# Patient Record
Sex: MALE | Race: BLACK OR AFRICAN AMERICAN | NOT HISPANIC OR LATINO | URBAN - METROPOLITAN AREA
[De-identification: names, ages, dates, MRNs, and addresses within clinical notes are randomized per-mention and may not be internally consistent; named-entity substitution may affect disease eponyms.]

---

## 2017-06-23 ENCOUNTER — INPATIENT (INPATIENT)
Facility: HOSPITAL | Age: 67
LOS: 2 days | Discharge: HOME | End: 2017-06-26
Attending: INTERNAL MEDICINE | Admitting: INTERNAL MEDICINE
Payer: MEDICARE

## 2017-06-23 VITALS
DIASTOLIC BLOOD PRESSURE: 120 MMHG | SYSTOLIC BLOOD PRESSURE: 207 MMHG | TEMPERATURE: 99 F | OXYGEN SATURATION: 100 % | RESPIRATION RATE: 16 BRPM | HEART RATE: 73 BPM

## 2017-06-23 DIAGNOSIS — Z29.9 ENCOUNTER FOR PROPHYLACTIC MEASURES, UNSPECIFIED: ICD-10-CM

## 2017-06-23 DIAGNOSIS — N18.9 CHRONIC KIDNEY DISEASE, UNSPECIFIED: ICD-10-CM

## 2017-06-23 DIAGNOSIS — I49.9 CARDIAC ARRHYTHMIA, UNSPECIFIED: ICD-10-CM

## 2017-06-23 DIAGNOSIS — R91.1 SOLITARY PULMONARY NODULE: ICD-10-CM

## 2017-06-23 DIAGNOSIS — M54.9 DORSALGIA, UNSPECIFIED: ICD-10-CM

## 2017-06-23 DIAGNOSIS — R79.89 OTHER SPECIFIED ABNORMAL FINDINGS OF BLOOD CHEMISTRY: ICD-10-CM

## 2017-06-23 DIAGNOSIS — I16.0 HYPERTENSIVE URGENCY: ICD-10-CM

## 2017-06-23 LAB
ALBUMIN SERPL ELPH-MCNC: 4.3 G/DL — SIGNIFICANT CHANGE UP (ref 3.3–5)
ALP SERPL-CCNC: 60 U/L — SIGNIFICANT CHANGE UP (ref 40–120)
ALT FLD-CCNC: 14 U/L — SIGNIFICANT CHANGE UP (ref 4–41)
APTT BLD: 28.3 SEC — SIGNIFICANT CHANGE UP (ref 27.5–37.4)
AST SERPL-CCNC: 28 U/L — SIGNIFICANT CHANGE UP (ref 4–40)
BASOPHILS # BLD AUTO: 0.04 K/UL — SIGNIFICANT CHANGE UP (ref 0–0.2)
BASOPHILS NFR BLD AUTO: 0.7 % — SIGNIFICANT CHANGE UP (ref 0–2)
BILIRUB SERPL-MCNC: 0.6 MG/DL — SIGNIFICANT CHANGE UP (ref 0.2–1.2)
BUN SERPL-MCNC: 13 MG/DL — SIGNIFICANT CHANGE UP (ref 7–23)
CALCIUM SERPL-MCNC: 9.2 MG/DL — SIGNIFICANT CHANGE UP (ref 8.4–10.5)
CHLORIDE SERPL-SCNC: 104 MMOL/L — SIGNIFICANT CHANGE UP (ref 98–107)
CK MB BLD-MCNC: 2.04 NG/ML — SIGNIFICANT CHANGE UP (ref 1–6.6)
CK MB BLD-MCNC: 2.16 NG/ML — SIGNIFICANT CHANGE UP (ref 1–6.6)
CK SERPL-CCNC: 267 U/L — HIGH (ref 30–200)
CK SERPL-CCNC: 322 U/L — HIGH (ref 30–200)
CO2 SERPL-SCNC: 26 MMOL/L — SIGNIFICANT CHANGE UP (ref 22–31)
CREAT SERPL-MCNC: 1.45 MG/DL — HIGH (ref 0.5–1.3)
EOSINOPHIL # BLD AUTO: 0.12 K/UL — SIGNIFICANT CHANGE UP (ref 0–0.5)
EOSINOPHIL NFR BLD AUTO: 2.1 % — SIGNIFICANT CHANGE UP (ref 0–6)
GLUCOSE SERPL-MCNC: 119 MG/DL — HIGH (ref 70–99)
HCT VFR BLD CALC: 45.1 % — SIGNIFICANT CHANGE UP (ref 39–50)
HGB BLD-MCNC: 14 G/DL — SIGNIFICANT CHANGE UP (ref 13–17)
IMM GRANULOCYTES NFR BLD AUTO: 0.2 % — SIGNIFICANT CHANGE UP (ref 0–1.5)
INR BLD: 0.96 — SIGNIFICANT CHANGE UP (ref 0.88–1.17)
LYMPHOCYTES # BLD AUTO: 2.15 K/UL — SIGNIFICANT CHANGE UP (ref 1–3.3)
LYMPHOCYTES # BLD AUTO: 37.7 % — SIGNIFICANT CHANGE UP (ref 13–44)
MAGNESIUM SERPL-MCNC: 1.9 MG/DL — SIGNIFICANT CHANGE UP (ref 1.6–2.6)
MCHC RBC-ENTMCNC: 24.4 PG — LOW (ref 27–34)
MCHC RBC-ENTMCNC: 31 % — LOW (ref 32–36)
MCV RBC AUTO: 78.6 FL — LOW (ref 80–100)
MONOCYTES # BLD AUTO: 0.6 K/UL — SIGNIFICANT CHANGE UP (ref 0–0.9)
MONOCYTES NFR BLD AUTO: 10.5 % — SIGNIFICANT CHANGE UP (ref 2–14)
NEUTROPHILS # BLD AUTO: 2.78 K/UL — SIGNIFICANT CHANGE UP (ref 1.8–7.4)
NEUTROPHILS NFR BLD AUTO: 48.8 % — SIGNIFICANT CHANGE UP (ref 43–77)
PHOSPHATE SERPL-MCNC: 2.8 MG/DL — SIGNIFICANT CHANGE UP (ref 2.5–4.5)
PLATELET # BLD AUTO: 193 K/UL — SIGNIFICANT CHANGE UP (ref 150–400)
PMV BLD: 9.8 FL — SIGNIFICANT CHANGE UP (ref 7–13)
POTASSIUM SERPL-MCNC: 3.9 MMOL/L — SIGNIFICANT CHANGE UP (ref 3.5–5.3)
POTASSIUM SERPL-SCNC: 3.9 MMOL/L — SIGNIFICANT CHANGE UP (ref 3.5–5.3)
PROT SERPL-MCNC: 7.6 G/DL — SIGNIFICANT CHANGE UP (ref 6–8.3)
PROTHROM AB SERPL-ACNC: 10.8 SEC — SIGNIFICANT CHANGE UP (ref 9.8–13.1)
RBC # BLD: 5.74 M/UL — SIGNIFICANT CHANGE UP (ref 4.2–5.8)
RBC # FLD: 15.9 % — HIGH (ref 10.3–14.5)
SODIUM SERPL-SCNC: 145 MMOL/L — SIGNIFICANT CHANGE UP (ref 135–145)
TROPONIN T SERPL-MCNC: < 0.06 NG/ML — SIGNIFICANT CHANGE UP (ref 0–0.06)
TROPONIN T SERPL-MCNC: < 0.06 NG/ML — SIGNIFICANT CHANGE UP (ref 0–0.06)
WBC # BLD: 5.7 K/UL — SIGNIFICANT CHANGE UP (ref 3.8–10.5)
WBC # FLD AUTO: 5.7 K/UL — SIGNIFICANT CHANGE UP (ref 3.8–10.5)

## 2017-06-23 PROCEDURE — 71020: CPT | Mod: 26

## 2017-06-23 PROCEDURE — 71275 CT ANGIOGRAPHY CHEST: CPT | Mod: 26

## 2017-06-23 PROCEDURE — 74174 CTA ABD&PLVS W/CONTRAST: CPT | Mod: 26

## 2017-06-23 RX ORDER — MORPHINE SULFATE 50 MG/1
4 CAPSULE, EXTENDED RELEASE ORAL ONCE
Qty: 0 | Refills: 0 | Status: DISCONTINUED | OUTPATIENT
Start: 2017-06-23 | End: 2017-06-23

## 2017-06-23 RX ORDER — HYDRALAZINE HCL 50 MG
25 TABLET ORAL THREE TIMES A DAY
Qty: 0 | Refills: 0 | Status: DISCONTINUED | OUTPATIENT
Start: 2017-06-23 | End: 2017-06-24

## 2017-06-23 RX ORDER — AMLODIPINE BESYLATE 2.5 MG/1
5 TABLET ORAL DAILY
Qty: 0 | Refills: 0 | Status: DISCONTINUED | OUTPATIENT
Start: 2017-06-23 | End: 2017-06-24

## 2017-06-23 RX ORDER — ACETAMINOPHEN 500 MG
650 TABLET ORAL EVERY 6 HOURS
Qty: 0 | Refills: 0 | Status: DISCONTINUED | OUTPATIENT
Start: 2017-06-23 | End: 2017-06-26

## 2017-06-23 RX ORDER — HYDRALAZINE HCL 50 MG
10 TABLET ORAL
Qty: 0 | Refills: 0 | Status: DISCONTINUED | OUTPATIENT
Start: 2017-06-23 | End: 2017-06-26

## 2017-06-23 RX ORDER — TRAMADOL HYDROCHLORIDE 50 MG/1
50 TABLET ORAL EVERY 6 HOURS
Qty: 0 | Refills: 0 | Status: DISCONTINUED | OUTPATIENT
Start: 2017-06-23 | End: 2017-06-26

## 2017-06-23 RX ORDER — LABETALOL HCL 100 MG
10 TABLET ORAL ONCE
Qty: 0 | Refills: 0 | Status: COMPLETED | OUTPATIENT
Start: 2017-06-23 | End: 2017-06-23

## 2017-06-23 RX ORDER — HEPARIN SODIUM 5000 [USP'U]/ML
5000 INJECTION INTRAVENOUS; SUBCUTANEOUS EVERY 8 HOURS
Qty: 0 | Refills: 0 | Status: DISCONTINUED | OUTPATIENT
Start: 2017-06-23 | End: 2017-06-26

## 2017-06-23 RX ORDER — SODIUM CHLORIDE 9 MG/ML
1000 INJECTION INTRAMUSCULAR; INTRAVENOUS; SUBCUTANEOUS ONCE
Qty: 0 | Refills: 0 | Status: COMPLETED | OUTPATIENT
Start: 2017-06-23 | End: 2017-06-23

## 2017-06-23 RX ADMIN — HEPARIN SODIUM 5000 UNIT(S): 5000 INJECTION INTRAVENOUS; SUBCUTANEOUS at 14:23

## 2017-06-23 RX ADMIN — Medication 10 MILLIGRAM(S): at 09:09

## 2017-06-23 RX ADMIN — HEPARIN SODIUM 5000 UNIT(S): 5000 INJECTION INTRAVENOUS; SUBCUTANEOUS at 20:59

## 2017-06-23 RX ADMIN — Medication 10 MILLIGRAM(S): at 17:33

## 2017-06-23 RX ADMIN — Medication 25 MILLIGRAM(S): at 20:59

## 2017-06-23 RX ADMIN — SODIUM CHLORIDE 1000 MILLILITER(S): 9 INJECTION INTRAMUSCULAR; INTRAVENOUS; SUBCUTANEOUS at 10:42

## 2017-06-23 RX ADMIN — MORPHINE SULFATE 4 MILLIGRAM(S): 50 CAPSULE, EXTENDED RELEASE ORAL at 09:39

## 2017-06-23 RX ADMIN — AMLODIPINE BESYLATE 5 MILLIGRAM(S): 2.5 TABLET ORAL at 12:50

## 2017-06-23 RX ADMIN — MORPHINE SULFATE 4 MILLIGRAM(S): 50 CAPSULE, EXTENDED RELEASE ORAL at 09:09

## 2017-06-23 NOTE — CONSULT NOTE ADULT - PROBLEM SELECTOR RECOMMENDATION 3
? Acute due to accelerated blood pressure: Agree with card for rather slow decline in blood pressure at this time. Patients' very comfortable.  .

## 2017-06-23 NOTE — H&P ADULT - PROBLEM SELECTOR PLAN 4
DVT ppx - HSQ - patient with incidental nodule found on CTA today  - no respiratory symptoms  - outpatient repeat CT chest in 6-12 month to assess stability

## 2017-06-23 NOTE — H&P ADULT - NSHPREVIEWOFSYSTEMS_GEN_ALL_CORE
Review of Systems:   CONSTITUTIONAL: No fever, weight loss, or fatigue  EYES: No eye pain, visual disturbances  ENMT:  No difficulty hearing, vertigo; No sinus or throat pain  NECK: No pain or stiffness  RESPIRATORY: No cough, wheezing, No shortness of breath  CARDIOVASCULAR: No chest pain, palpitations, dizziness, or leg swelling  GASTROINTESTINAL: No abdominal or epigastric pain. No nausea, vomiting, or hematemesis; No diarrhea or constipation.   GENITOURINARY: No dysuria or increased urinary frequency  NEUROLOGICAL: No headaches or tremors  SKIN: No itching, burning, rashes, or lesions   LYMPH NODES: No enlarged glands  ENDOCRINE: No heat or cold intolerance; No hair loss  MUSCULOSKELETAL: No joint pain or swelling; No muscle, back, or extremity pain  PSYCHIATRIC: No depression, anxiety, mood swings, or difficulty sleeping  HEME/LYMPH: No easy bruising, or bleeding gums  ALLERY AND IMMUNOLOGIC: No hives or eczema Review of Systems:   CONSTITUTIONAL: No fever, weight loss, or fatigue  EYES: No eye pain, visual disturbances  ENMT:  No difficulty hearing, vertigo; No sinus or throat pain  NECK: No pain or stiffness  RESPIRATORY: No cough, wheezing, No shortness of breath  CARDIOVASCULAR: No chest pain, palpitations, dizziness, or leg swelling  GASTROINTESTINAL: No abdominal or epigastric pain. No nausea, vomiting, or hematemesis; No diarrhea or constipation.   GENITOURINARY: No dysuria or increased urinary frequency  NEUROLOGICAL: No headaches or tremors  SKIN: No itching, burning, rashes, or lesions   LYMPH NODES: No enlarged glands in neck  MUSCULOSKELETAL: No joint pain or swelling; + back pain  HEME/LYMPH: No easy bruising, or bleeding gums

## 2017-06-23 NOTE — ED PROVIDER NOTE - OBJECTIVE STATEMENT
67M h/o HTN presenting with back pain. Notes L lower back pain radiating down both legs starting 2 days ago, constant, 6/10. Notes having back pain previously but not radiating down legs. Has not seen doctor or taken prescription medication in years, unsure of other medical history or previous medications. Denies F, visual changes, CP, SOB, abd pain, N/V/D, urinary changes, difficulty walking. 67M h/o HTN presenting with back pain. Notes L lower back pain radiating down both legs starting 2 days ago, constant, 6/10. Notes having back pain previously but not radiating down legs. Has not seen doctor or taken prescription medication in years, unsure of other medical history or previous medications. Denies fever, visual changes, CP, SOB, abd pain, N/V/D, urinary changes, difficulty walking.

## 2017-06-23 NOTE — ED PROVIDER NOTE - MEDICAL DECISION MAKING DETAILS
67M h/o HTN p/w back pain radiating down both legs x2 days, no acute neuro findings, associated with HTN to systolic in 220s, concerning for dissection  -labs, pain control, XR, CT Montero: 67M h/o HTN p/w back pain radiating down both legs x2 days, no acute neuro findings, associated with HTN to systolic in 220s, concerning for aortic dissection, will obtain CT r/o Ao dissection  -labs, pain control, XR, CT

## 2017-06-23 NOTE — H&P ADULT - NSHPSOCIALHISTORY_GEN_ALL_CORE
Lives with girlfriend and son  No prior cigarette use  Drinks 2 beers - sometimes every other day  No drug use  Unemployed  Originally from [place]

## 2017-06-23 NOTE — H&P ADULT - PROBLEM SELECTOR PLAN 1
- patient with -220 in ED  - goal -180  - start norvasc 5  - admit to telemetry  - unclear if elevated creatinine is patient's baseline or 2/2 severe hypertension  - trend cardiac enzymes  - consider inpatient TTE

## 2017-06-23 NOTE — H&P ADULT - NSHPPHYSICALEXAM_GEN_ALL_CORE
Vital Signs Last 24 Hrs  T(C): 36.7, Max: 37 (06-23 @ 08:03)  HR: 54 (54 - 73)  BP: 212/95 (182/104 - 221/123)  RR: 17 (16 - 17)  SpO2: 99% (99% - 100%)  Wt(kg): --  CAPILLARY BLOOD GLUCOSE    I&O's Summary      PHYSICAL EXAM:  GENERAL: NAD, well-developed  HEAD:  Atraumatic, Normocephalic  EYES: EOMI, PERRLA, conjunctiva and sclera clear  NECK: Supple, No JVD  CHEST/LUNG: Clear to auscultation bilaterally; No wheeze  HEART: Regular rate and rhythm; No murmurs, rubs, or gallops  ABDOMEN: Soft, Nontender, Nondistended; Bowel sounds present  EXTREMITIES:  2+ Peripheral Pulses, No clubbing, cyanosis, or edema  PSYCH: AAOx3  NEUROLOGY: non-focal  SKIN: No rashes or lesions Vital Signs Last 24 Hrs  T(C): 36.7, Max: 37 (06-23 @ 08:03)  HR: 54 (54 - 73)  BP: 212/95 (182/104 - 221/123)  RR: 17 (16 - 17)  SpO2: 99% (99% - 100%)  Wt(kg): --  CAPILLARY BLOOD GLUCOSE    I&O's Summary      PHYSICAL EXAM:  GENERAL: NAD, well-developed  HEAD:  Atraumatic, Normocephalic  EYES: EOMI, conjunctiva and sclera clear  NECK: Supple, No JVD  CHEST/LUNG: Clear to auscultation bilaterally; No wheeze crackles or rales  HEART: Regular rate and rhythm; No murmurs, rubs, or gallops  ABDOMEN: Soft, Nontender, Nondistended; Bowel sounds present  BACK: no spinal or paraspinal muscle tenderness to palpation  EXTREMITIES:  No clubbing, cyanosis, or edema  PSYCH: AAOx3  NEUROLOGY: non-focal; straight leg raise negative bilaterally  SKIN: No rashes or lesions

## 2017-06-23 NOTE — ED ADULT NURSE NOTE - OBJECTIVE STATEMENT
Pt presents to room 26, A&Ox3, ambulatory at baseline without assistance, coming in for evaluation of intermittent lower back pain radiating into the left leg x several months, worse over the past day.  denies any bowel/bladder incontinence, denies any fall/trauma precipating back Pt presents to room 26, A&Ox3, ambulatory at baseline without assistance, coming in for evaluation of intermittent lower back pain radiating into the left leg x several months, worse over the past day.  denies any bowel/bladder incontinence, denies any fall/trauma.  pt very hypertensive, has not seen a doctor in 5 years, non compliant with blood pressure medications x 2 years.  Denies any chest pain, dizziness, headache, nausea, vomiting, shortness of breath, palpitations, diarrhea, fever, constipation, or chills. Pt presents to room 26, A&Ox3, ambulatory at baseline without assistance, coming in for evaluation of intermittent lower back pain radiating into the left leg x several months, worse over the past day.  denies any bowel/bladder incontinence, denies any fall/trauma.  pt very hypertensive, has not seen a doctor in 5 years, non compliant with blood pressure medications x 2 years.  Denies any chest pain, dizziness, headache, nausea, vomiting, shortness of breath, palpitations, diarrhea, fever, constipation, or chills.  IV established in right ac with a 20g, labs drawn and sent, call bell in reach, side rails up, bed in locked position, md evaluation in progress, pt on telemetry-NSR @ 72 noted, will continue to monitor.

## 2017-06-23 NOTE — H&P ADULT - PROBLEM SELECTOR PLAN 3
- patient with back pain without red flag symptoms (no weight loss, fevers, urinary or fecal incontinence, loss of sensation)  - resolved at time of this H&P, suspect musculoskeletal in nature given history  - c/w pain control  - will likely need physical therapy as outpatient

## 2017-06-23 NOTE — H&P ADULT - PROBLEM SELECTOR PLAN 2
- patient with elevated creatinine of 1.45 today, baseline unknown; unclear if elevated creatinine 2/2 elevated BP  - continue to trend, monitor for contrast induced nephropathy given that patient received contrast today  - avoid nephrotoxic medications

## 2017-06-23 NOTE — CONSULT NOTE ADULT - PROBLEM SELECTOR RECOMMENDATION 2
monitor crt , K  consider renal consult
Uncontrolled blood pressure: on hydralazine: optimize antihypertensives

## 2017-06-23 NOTE — H&P ADULT - HISTORY OF PRESENT ILLNESS
Patient is a 66 y/o M PMH HTN not on medications for the last 5 years, presents with left sided lower back pain x 1 day. Patient states that pain is 10/10 in severity, sharp, with radiation the right lower back. No tingling or numbness in legs. Denies any recent trauma or fall. States he has had back pain for years, similar to this pain, but not as severe. No pain at rest, but movement exacerbates the pain. States he took some morphine, which helped with the pain. No fevers, chills, night sweats, chest pain, SOB, abdominal pain, nausea, vomiting, diarrhea or constipation. No dysuria. No headache, vision changes, or lower extremity edema. Otherwise feels well. States he took a medication 5 years ago for blood pressure, knows the dose was 5 mg but not sure of the name.    In ED:  Vitals : T 98.1, HR 72, /123, RR 16, O2 sat 99% on room air  Received morphine 4 mg IV x 1, labetalol 10 mg IV x 1, NS 1L x1    Patient admitted to telemetry for further management

## 2017-06-23 NOTE — ED PROVIDER NOTE - PROGRESS NOTE DETAILS
BP responded adequately to labetalol 10mg IV. Labs show mildly elevated Cr 1.45, no baseline for comparison. Prelim CT results show no dissection. 4 beats of V-tach noted on cardiac monitor while in ED. Will admit for further cardiac monitoring and treatment of hypertension, d/w Dr Juarez who agreed to admit

## 2017-06-23 NOTE — ED ADULT TRIAGE NOTE - CHIEF COMPLAINT QUOTE
Pt arrives to ED c/o b/l lower back pain radiating down thighs. Denies falls/trauma. States pain has been going on "for a while, but since yesterday afternoon has been consistent". Took one of his wifes "pain killers" unsure name of medication prior to coming to ED. Pt is calm/comfortable appearing, ambulating w/o asst. Pt BP noted to be elevated in triage 207/120, states used to take BP meds but self dc'd 2 yrs ago, no other PMHx. Denies HA/vision changes.

## 2017-06-23 NOTE — H&P ADULT - ASSESSMENT
68 y/o M PMH HTN not on medications 66 y/o M PMH HTN not on medications presents with back pain x 1 day, found to have hypertensive urgency, also with elevated creatinine (baseline unknown)

## 2017-06-23 NOTE — H&P ADULT - NSHPLABSRESULTS_GEN_ALL_CORE
LABS:                        14.0   5.70  )-----------( 193      ( 23 Jun 2017 08:56 )             45.1     06-23    145  |  104  |  13  ----------------------------<  119<H>  3.9   |  26  |  1.45<H>    Ca    9.2      23 Jun 2017 08:56  Phos  2.8     06-23  Mg     1.9     06-23    TPro  7.6  /  Alb  4.3  /  TBili  0.6  /  DBili  x   /  AST  28  /  ALT  14  /  AlkPhos  60  06-23    PT/INR - ( 23 Jun 2017 08:56 )   PT: 10.8 SEC;   INR: 0.96          PTT - ( 23 Jun 2017 08:56 )  PTT:28.3 SEC  CARDIAC MARKERS ( 23 Jun 2017 08:56 )  x     / < 0.06 ng/mL / 322 u/L / 2.16 ng/mL / x              RADIOLOGY & ADDITIONAL TESTS:    Imaging Personally Reviewed:    Consultant(s) Notes Reviewed:      Care Discussed with Consultants/Other Providers: LABS:                        14.0   5.70  )-----------( 193      ( 23 Jun 2017 08:56 )             45.1     06-23    145  |  104  |  13  ----------------------------<  119<H>  3.9   |  26  |  1.45<H>    Ca    9.2      23 Jun 2017 08:56  Phos  2.8     06-23  Mg     1.9     06-23    TPro  7.6  /  Alb  4.3  /  TBili  0.6  /  DBili  x   /  AST  28  /  ALT  14  /  AlkPhos  60  06-23    PT/INR - ( 23 Jun 2017 08:56 )   PT: 10.8 SEC;   INR: 0.96          PTT - ( 23 Jun 2017 08:56 )  PTT:28.3 SEC  CARDIAC MARKERS ( 23 Jun 2017 08:56 )  x     / < 0.06 ng/mL / 322 u/L / 2.16 ng/mL / x              RADIOLOGY & ADDITIONAL TESTS:    Imaging Personally Reviewed:    CTA chest/abdomen/pelvis:  No aortic dissection.    7 mm right lower lobe pulmonary nodule. A 6 to 12 month follow-up CT is   recommended to ascertain stability.

## 2017-06-23 NOTE — CONSULT NOTE ADULT - PROBLEM SELECTOR RECOMMENDATION 9
cont norvasc  will hold of to bb for now given low HR  will add hydralazine , keep BP between 150-170 systolic for now.  check echo, check renal artery duplex rule out NATALIA.
7 mm nodule:  RLL  nON SMOKER: OUTPATIENT FOLLOW ct scan chest in 6 months

## 2017-06-24 DIAGNOSIS — I47.2 VENTRICULAR TACHYCARDIA: ICD-10-CM

## 2017-06-24 LAB
BUN SERPL-MCNC: 11 MG/DL — SIGNIFICANT CHANGE UP (ref 7–23)
CALCIUM SERPL-MCNC: 9.3 MG/DL — SIGNIFICANT CHANGE UP (ref 8.4–10.5)
CHLORIDE SERPL-SCNC: 102 MMOL/L — SIGNIFICANT CHANGE UP (ref 98–107)
CO2 SERPL-SCNC: 25 MMOL/L — SIGNIFICANT CHANGE UP (ref 22–31)
CREAT SERPL-MCNC: 1.24 MG/DL — SIGNIFICANT CHANGE UP (ref 0.5–1.3)
GLUCOSE SERPL-MCNC: 141 MG/DL — HIGH (ref 70–99)
HCT VFR BLD CALC: 46.1 % — SIGNIFICANT CHANGE UP (ref 39–50)
HGB BLD-MCNC: 14.3 G/DL — SIGNIFICANT CHANGE UP (ref 13–17)
MAGNESIUM SERPL-MCNC: 2 MG/DL — SIGNIFICANT CHANGE UP (ref 1.6–2.6)
MCHC RBC-ENTMCNC: 24.3 PG — LOW (ref 27–34)
MCHC RBC-ENTMCNC: 31 % — LOW (ref 32–36)
MCV RBC AUTO: 78.4 FL — LOW (ref 80–100)
PLATELET # BLD AUTO: 199 K/UL — SIGNIFICANT CHANGE UP (ref 150–400)
PMV BLD: 10.6 FL — SIGNIFICANT CHANGE UP (ref 7–13)
POTASSIUM SERPL-MCNC: 3.4 MMOL/L — LOW (ref 3.5–5.3)
POTASSIUM SERPL-SCNC: 3.4 MMOL/L — LOW (ref 3.5–5.3)
RBC # BLD: 5.88 M/UL — HIGH (ref 4.2–5.8)
RBC # FLD: 15.9 % — HIGH (ref 10.3–14.5)
SODIUM SERPL-SCNC: 142 MMOL/L — SIGNIFICANT CHANGE UP (ref 135–145)
WBC # BLD: 4.96 K/UL — SIGNIFICANT CHANGE UP (ref 3.8–10.5)
WBC # FLD AUTO: 4.96 K/UL — SIGNIFICANT CHANGE UP (ref 3.8–10.5)

## 2017-06-24 RX ORDER — POTASSIUM CHLORIDE 20 MEQ
40 PACKET (EA) ORAL ONCE
Qty: 0 | Refills: 0 | Status: COMPLETED | OUTPATIENT
Start: 2017-06-24 | End: 2017-06-24

## 2017-06-24 RX ORDER — METOPROLOL TARTRATE 50 MG
25 TABLET ORAL ONCE
Qty: 0 | Refills: 0 | Status: COMPLETED | OUTPATIENT
Start: 2017-06-24 | End: 2017-06-24

## 2017-06-24 RX ORDER — AMLODIPINE BESYLATE 2.5 MG/1
5 TABLET ORAL ONCE
Qty: 0 | Refills: 0 | Status: COMPLETED | OUTPATIENT
Start: 2017-06-24 | End: 2017-06-24

## 2017-06-24 RX ORDER — METOPROLOL TARTRATE 50 MG
25 TABLET ORAL
Qty: 0 | Refills: 0 | Status: DISCONTINUED | OUTPATIENT
Start: 2017-06-24 | End: 2017-06-26

## 2017-06-24 RX ORDER — HYDRALAZINE HCL 50 MG
50 TABLET ORAL EVERY 8 HOURS
Qty: 0 | Refills: 0 | Status: DISCONTINUED | OUTPATIENT
Start: 2017-06-24 | End: 2017-06-26

## 2017-06-24 RX ORDER — AMLODIPINE BESYLATE 2.5 MG/1
10 TABLET ORAL DAILY
Qty: 0 | Refills: 0 | Status: DISCONTINUED | OUTPATIENT
Start: 2017-06-25 | End: 2017-06-26

## 2017-06-24 RX ADMIN — Medication 25 MILLIGRAM(S): at 21:06

## 2017-06-24 RX ADMIN — Medication 25 MILLIGRAM(S): at 12:49

## 2017-06-24 RX ADMIN — Medication 25 MILLIGRAM(S): at 05:04

## 2017-06-24 RX ADMIN — HEPARIN SODIUM 5000 UNIT(S): 5000 INJECTION INTRAVENOUS; SUBCUTANEOUS at 21:06

## 2017-06-24 RX ADMIN — HEPARIN SODIUM 5000 UNIT(S): 5000 INJECTION INTRAVENOUS; SUBCUTANEOUS at 15:17

## 2017-06-24 RX ADMIN — Medication 40 MILLIEQUIVALENT(S): at 12:49

## 2017-06-24 RX ADMIN — Medication 50 MILLIGRAM(S): at 15:17

## 2017-06-24 RX ADMIN — Medication 50 MILLIGRAM(S): at 21:06

## 2017-06-24 RX ADMIN — AMLODIPINE BESYLATE 5 MILLIGRAM(S): 2.5 TABLET ORAL at 12:49

## 2017-06-24 RX ADMIN — HEPARIN SODIUM 5000 UNIT(S): 5000 INJECTION INTRAVENOUS; SUBCUTANEOUS at 05:04

## 2017-06-24 RX ADMIN — AMLODIPINE BESYLATE 5 MILLIGRAM(S): 2.5 TABLET ORAL at 05:04

## 2017-06-24 NOTE — CONSULT NOTE ADULT - SUBJECTIVE AND OBJECTIVE BOX
HPI:  Patient is a 66 y/o M PMH HTN not on medications for the last 5 years, presents with left sided lower back pain x 1 day. Patient states that pain is 10/10 in severity, sharp, with radiation the right lower back. No tingling or numbness in legs. Denies any recent trauma or fall. States he has had back pain for years, similar to this pain, but not as severe. No pain at rest, but movement exacerbates the pain. States he took some morphine, which helped with the pain. No fevers, chills, night sweats, chest pain, SOB, abdominal pain, nausea, vomiting, diarrhea or constipation. No dysuria. No headache, vision changes, or lower extremity edema. Otherwise feels well. States he took a medication 5 years ago for blood pressure, knows the dose was 5 mg but not sure of the name.    At present pt with very little pain. On questioning reports episodic radiating pain into right proximal lower extremity.    In ED:  Vitals : T 98.1, HR 72, /123, RR 16, O2 sat 99% on room air  Received morphine 4 mg IV x 1, labetalol 10 mg IV x 1, NS 1L x1    Patient admitted to telemetry for further management (23 Jun 2017 12:37)          Review of Systems:  All review of systems negative, except for those marked:  General:		  Eyes:			  ENT:			  Pulmonary:		  Cardiac:		  Gastrointestinal:	  Renal/Urologic:	  Musculoskeletal		  Endocrine:		  Hematologic:	  Neurologic:		  Skin:			  Allergy/Immune	  Psychiatric:		    PAST MEDICAL & SURGICAL HISTORY:  HTN (hypertension)  No significant past surgical history    Past Hospitalizations:  MEDICATIONS  (STANDING):  amLODIPine   Tablet 5milliGRAM(s) Oral daily  heparin  Injectable 5000Unit(s) SubCutaneous every 8 hours  hydrALAZINE 25milliGRAM(s) Oral three times a day    MEDICATIONS  (PRN):  acetaminophen   Tablet. 650milliGRAM(s) Oral every 6 hours PRN Mild Pain (1 - 3)  traMADol 50milliGRAM(s) Oral every 6 hours PRN moderate or severe pain  hydrALAZINE Injectable 10milliGRAM(s) IV Push four times a day PRN systolic greater than 185 mmHg    Allergies    No Known Allergies    Intolerances          FAMILY HISTORY:  No pertinent family history in first degree relatives    [] Mental Retardation/Developmental Delay:  [] Cerebral Palsy:  [] Autism:  [] Deafness:  [] Speech Delay:  [] Blindness:  [] Learning Disorder:  [] Depression:  [] ADD  [] Bipolar Disorder:  [] Tourette  [] Obsessive Compulsive DIsorder:  [] Epilepsy  [] Psychosis  [] Other:    Social History  -ETOH/Tob    Vital Signs Last 24 Hrs  T(C): 36.8, Max: 37 (06-23 @ 08:03)  T(F): 98.2, Max: 98.6 (06-23 @ 08:03)  HR: 69 (54 - 78)  BP: 152/100 (152/100 - 221/123)  BP(mean): 121 (121 - 131)  RR: 18 (14 - 18)  SpO2: 98% (98% - 100%)  Daily Height in cm: 180.34 (23 Jun 2017 18:23)    Daily       GENERAL PHYSICAL EXAM  All physical exam findings normal, except for those marked:  General:	well nourished, not acutely or chronically ill-appearing  HEENT:	normocephalic, atraumatic, clear conjunctiva, external ear normal, TM clear, nasal mucosa normal, oral pharynx clear  Neck:          supple, full range of motion, no nuchal rigidity  Extremities:	no joint swelling, erythema, tenderness; normal ROM, no contractures  Skin:		no rash    NEUROLOGIC EXAM  Mental Status:     Oriented to time/place/person; Good eye contact ; follow simple commands ;  Age appropriate language  and fund of  knowledge.  Cranial Nerves:   PERRL, EOMI, no facial asymmetry , V1-V3 intact , symmetric palate, tongue midline.   Eyes:			Normal: optic discs   Visual Fields:		Full visual field  Muscle Strength:	 Full strength 5/5, proximal and distal,  upper and lower extremities  Muscle Tone:	Normal tone  Deep Tendon Reflexes:         2+/4  : Biceps, Brachioradialis, Triceps Bilateral;  2+/4 : Pattelar, Ankle bilateral. No clonus.  Plantar Response:	Plantar reflexes flexion bilaterally  Sensation:		Intact to pain, light touch, temperature and vibration throughout.  Coordination/	No dysmetria in finger to nose test bilaterally  Cerebellum	  Tandem Gait/Romberg	Normal gait     Lab Results:                        14.0   5.70  )-----------( 193      ( 23 Jun 2017 08:56 )             45.1     06-23    145  |  104  |  13  ----------------------------<  119<H>  3.9   |  26  |  1.45<H>    Ca    9.2      23 Jun 2017 08:56  Phos  2.8     06-23  Mg     1.9     06-23    TPro  7.6  /  Alb  4.3  /  TBili  0.6  /  DBili  x   /  AST  28  /  ALT  14  /  AlkPhos  60  06-23    LIVER FUNCTIONS - ( 23 Jun 2017 08:56 )  Alb: 4.3 g/dL / Pro: 7.6 g/dL / ALK PHOS: 60 u/L / ALT: 14 u/L / AST: 28 u/L / GGT: x           PT/INR - ( 23 Jun 2017 08:56 )   PT: 10.8 SEC;   INR: 0.96          PTT - ( 23 Jun 2017 08:56 )  PTT:28.3 SEC
CHIEF COMPLAINT: back pain    HISTORY OF PRESENT ILLNESS:    This is a pleasant 67 male with history of HTN not on meds, presented to ED with low back pain found to have very high BP and elevated crt , CTA ruled out for aortic dissection.   he denies any chest pain, sob, dizziness or palpitation     PAST MEDICAL & SURGICAL HISTORY:  HTN (hypertension)  No significant past surgical history          MEDICATIONS:  amLODIPine   Tablet 5milliGRAM(s) Oral daily  heparin  Injectable 5000Unit(s) SubCutaneous every 8 hours  hydrALAZINE 25milliGRAM(s) Oral three times a day  hydrALAZINE Injectable 10milliGRAM(s) IV Push four times a day PRN        acetaminophen   Tablet. 650milliGRAM(s) Oral every 6 hours PRN  traMADol 50milliGRAM(s) Oral every 6 hours PRN            FAMILY HISTORY:  No pertinent family history in first degree relatives      Non-contributory    SOCIAL HISTORY:    No tobacco, drugs or etoh    Allergies    No Known Allergies    Intolerances    	    REVIEW OF SYSTEMS:  CONSTITUTIONAL: No fever, weight loss, or fatigue  EYES: No eye pain, visual disturbances, or discharge  ENMT:  No difficulty hearing, tinnitus, vertigo; No sinus or throat pain  NECK: No pain or stiffness  RESPIRATORY: No cough, wheezing, chills or hemoptysis; No Shortness of Breath  CARDIOVASCULAR: No chest pain, palpitations, passing out, dizziness, or leg swelling  GASTROINTESTINAL: No abdominal or epigastric pain. No nausea, vomiting, or hematemesis; No diarrhea or constipation. No melena or hematochezia.  GENITOURINARY: No dysuria, frequency, hematuria, or incontinence  NEUROLOGICAL: No headaches, memory loss, loss of strength, numbness, or tremors  SKIN: No itching, burning, rashes, or lesions   LYMPH Nodes: No enlarged glands  ENDOCRINE: No heat or cold intolerance; No hair loss  MUSCULOSKELETAL: No joint pain or swelling; No muscle, back, or extremity pain  PSYCHIATRIC: No depression, anxiety, mood swings, or difficulty sleeping  HEME/LYMPH: No easy bruising, or bleeding gums  ALLERY AND IMMUNOLOGIC: No hives or eczema	        PHYSICAL EXAM:  T(C): 36.7, Max: 37 (06-23 @ 08:03)  HR: 64 (54 - 73)  BP: 194/109 (182/104 - 221/123)  RR: 16 (16 - 17)  SpO2: 99% (99% - 100%)  Wt(kg): --  I&O's Summary      Appearance: Normal	  HEENT:   Normal oral mucosa, PERRL, EOMI	  Lymphatic: No lymphadenopathy  Cardiovascular: Normal S1 S2, Murmur:   Neck: JVP normal  Respiratory: Lungs clear to auscultation	  Gastrointestinal:  Soft, Non-tender, + BS	  Skin: No rashes, No ecchymoses, No cyanosis	  Neurologic: Non-focal, awake , alert , oriented   Extremities: Normal range of motion, No clubbing, cyanosis or edema  Vascular: Peripheral pulses palpable 2+ bilaterally    TELEMETRY: 	    ECG:  	  RADIOLOGY:  OTHER: 	  	  LABS:	 	    CARDIAC MARKERS:  Troponin T, Serum: < 0.06 ng/mL (06-23 @ 15:25)  Troponin T, Serum: < 0.06 ng/mL (06-23 @ 08:56)      CKMB: 2.04 ng/mL (06-23 @ 15:25)  CKMB: 2.16 ng/mL (06-23 @ 08:56)                              14.0   5.70  )-----------( 193      ( 23 Jun 2017 08:56 )             45.1     06-23    145  |  104  |  13  ----------------------------<  119<H>  3.9   |  26  |  1.45<H>    Ca    9.2      23 Jun 2017 08:56  Phos  2.8     06-23  Mg     1.9     06-23    TPro  7.6  /  Alb  4.3  /  TBili  0.6  /  DBili  x   /  AST  28  /  ALT  14  /  AlkPhos  60  06-23    proBNP:   Lipid Profile:   HgA1c:   TSH:
Pt is seen and examined  At th is time he has no symptoms no cough or SOB  He has no pulmonary history      Patient is a 67y old  Male who presents with a chief complaint of back pain (23 Jun 2017 12:37)      HPI:  Patient is a 68 y/o M PMH HTN not on medications for the last 5 years, presents with left sided lower back pain x 1 day. Patient states that pain is 10/10 in severity, sharp, with radiation the right lower back. No tingling or numbness in legs. Denies any recent trauma or fall. States he has had back pain for years, similar to this pain, but not as severe. No pain at rest, but movement exacerbates the pain. States he took some morphine, which helped with the pain. No fevers, chills, night sweats, chest pain, SOB, abdominal pain, nausea, vomiting, diarrhea or constipation. No dysuria. No headache, vision changes, or lower extremity edema. Otherwise feels well. States he took a medication 5 years ago for blood pressure, knows the dose was 5 mg but not sure of the name.    In ED:  Vitals : T 98.1, HR 72, /123, RR 16, O2 sat 99% on room air  Received morphine 4 mg IV x 1, labetalol 10 mg IV x 1, NS 1L x1    Patient admitted to telemetry for further management (23 Jun 2017 12:37)      ?FOLLOWING PRESENT  [x ] Hx of PE/DVT, [ x] Hx COPD, [x ] Hx of Asthma, [ x] Hx of Hospitalization, [x ]  Hx of BiPAP/CPAP use, [x ] Hx of CARLEY    Allergies    No Known Allergies    Intolerances        PAST MEDICAL & SURGICAL HISTORY:  HTN (hypertension)  No significant past surgical history      FAMILY HISTORY:  No pertinent family history in first degree relatives      Social History: [x  ] TOBACCO                  [ x ] ETOH                                 [ x ] IVDA/DRUGS    REVIEW OF SYSTEMS      General:	x    Skin/Breast:x  	  Ophthalmologic:x  	  ENMT:	x    Respiratory and Thorax:x  	  Cardiovascular:	x    Gastrointestinal:	x    Genitourinary:	  x  Musculoskeletal:	back pain    Neurological:	x    Psychiatric:	x    Hematology/Lymphatics:x	    Endocrine:	x    Allergic/Immunologic:	x    MEDICATIONS  (STANDING):  amLODIPine   Tablet 5milliGRAM(s) Oral daily  heparin  Injectable 5000Unit(s) SubCutaneous every 8 hours  hydrALAZINE 25milliGRAM(s) Oral three times a day    MEDICATIONS  (PRN):  acetaminophen   Tablet. 650milliGRAM(s) Oral every 6 hours PRN Mild Pain (1 - 3)  traMADol 50milliGRAM(s) Oral every 6 hours PRN moderate or severe pain  hydrALAZINE Injectable 10milliGRAM(s) IV Push four times a day PRN systolic greater than 185 mmHg       Vital Signs Last 24 Hrs  T(C): 36.7, Max: 37 (06-23 @ 08:03)  T(F): 98.1, Max: 98.6 (06-23 @ 08:03)  HR: 64 (54 - 73)  BP: 194/109 (182/104 - 221/123)  BP(mean): --  RR: 16 (16 - 17)  SpO2: 99% (99% - 100%)        I&O's Summary      Physical Exam:   GENERAL: NAD, well-groomed, well-developed  HEENT: ZIA/   Atraumatic, Normocephalic  ENMT: No tonsillar erythema, exudates, or enlargement; Moist mucous membranes, Good dentition, No lesions  NECK: Supple, No JVD, Normal thyroid  CHEST/LUNG: Clear to percussion bilaterally; No rales, rhonchi, wheezing, or rubs  CVS: Regular rate and rhythm; No murmurs, rubs, or gallops  GI: : Soft, Nontender, Nondistended; Bowel sounds present  NERVOUS SYSTEM:  Alert & Oriented X3  EXTREMITIES:  2+ Peripheral Pulses, No clubbing, cyanosis, or edema  LYMPH: No lymphadenopathy noted  SKIN: No rashes or lesions  ENDOCRINOLOGY: No Thyromegaly  PSYCH: Appropriate    Labs:    CARDIAC MARKERS ( 23 Jun 2017 15:25 )  x     / < 0.06 ng/mL / 267 u/L / 2.04 ng/mL / x      CARDIAC MARKERS ( 23 Jun 2017 08:56 )  x     / < 0.06 ng/mL / 322 u/L / 2.16 ng/mL / x                                14.0   5.70  )-----------( 193      ( 23 Jun 2017 08:56 )             45.1     06-23    145  |  104  |  13  ----------------------------<  119<H>  3.9   |  26  |  1.45<H>    Ca    9.2      23 Jun 2017 08:56  Phos  2.8     06-23  Mg     1.9     06-23    TPro  7.6  /  Alb  4.3  /  TBili  0.6  /  DBili  x   /  AST  28  /  ALT  14  /  AlkPhos  60  06-23    CAPILLARY BLOOD GLUCOSE    LIVER FUNCTIONS - ( 23 Jun 2017 08:56 )  Alb: 4.3 g/dL / Pro: 7.6 g/dL / ALK PHOS: 60 u/L / ALT: 14 u/L / AST: 28 u/L / GGT: x           PT/INR - ( 23 Jun 2017 08:56 )   PT: 10.8 SEC;   INR: 0.96          PTT - ( 23 Jun 2017 08:56 )  PTT:28.3 SEC      Studies  Chest X-RAY  CT SCAN Chest ETROPERITONEUM: No lymphadenopathy.    ABDOMINAL WALL:Within normal limits.  BONES: Degenerative changes of the spine.    IMPRESSION:    No aortic dissection.    7 mm right lower lobe pulmonary nodule. A 6 to 12 month follow-up CT is   recommended to ascertain stability.  CT Abdomen  Venous Dopplers: LE:   Others

## 2017-06-24 NOTE — PROGRESS NOTE ADULT - ASSESSMENT
68 y/o M PMH HTN not on medications presents with back pain x 1 day, found to have hypertensive urgency and RLL pulmonary nodule.

## 2017-06-24 NOTE — CONSULT NOTE ADULT - ASSESSMENT
Lumbago  Lumbar Radiculopathy
68 y/o M PMH HTN not on medications presents with back pain x 1 day, found to have hypertensive urgency, also with elevated creatinine (baseline unknown)  on ct chest found to have RLL pulmonary nodule

## 2017-06-24 NOTE — CONSULT NOTE ADULT - ATTENDING COMMENTS
Patient stable at present. I advised outpatient neurology follow up for possible neuroimaging if symptoms persist.
Thank you for allowing me to participate in the care of this patient.   Zia Darling MD, [facility]  [phone]

## 2017-06-24 NOTE — PROGRESS NOTE ADULT - ASSESSMENT
68 yo M as above:  1. NSVT - Echo, Cardio f/u, c/w Metoprolol  2. HTN - BP uncontrolled, increase Norvasc to 10mg daily (give 5mg now) and Hydralazine to 50mg q8h, monitor BP  3, ? CKD - Cr improved, outpt Renal eval  4. DVT prophylaxis  5. Back pain - lumbar radiculopathy, pain control, outpt follow up

## 2017-06-25 DIAGNOSIS — R00.1 BRADYCARDIA, UNSPECIFIED: ICD-10-CM

## 2017-06-25 LAB
BUN SERPL-MCNC: 15 MG/DL — SIGNIFICANT CHANGE UP (ref 7–23)
CALCIUM SERPL-MCNC: 9.2 MG/DL — SIGNIFICANT CHANGE UP (ref 8.4–10.5)
CHLORIDE SERPL-SCNC: 99 MMOL/L — SIGNIFICANT CHANGE UP (ref 98–107)
CO2 SERPL-SCNC: 23 MMOL/L — SIGNIFICANT CHANGE UP (ref 22–31)
CREAT SERPL-MCNC: 1.34 MG/DL — HIGH (ref 0.5–1.3)
GLUCOSE SERPL-MCNC: 107 MG/DL — HIGH (ref 70–99)
HCT VFR BLD CALC: 46.2 % — SIGNIFICANT CHANGE UP (ref 39–50)
HGB BLD-MCNC: 14.2 G/DL — SIGNIFICANT CHANGE UP (ref 13–17)
MAGNESIUM SERPL-MCNC: 2.1 MG/DL — SIGNIFICANT CHANGE UP (ref 1.6–2.6)
MCHC RBC-ENTMCNC: 24.1 PG — LOW (ref 27–34)
MCHC RBC-ENTMCNC: 30.7 % — LOW (ref 32–36)
MCV RBC AUTO: 78.6 FL — LOW (ref 80–100)
PLATELET # BLD AUTO: 186 K/UL — SIGNIFICANT CHANGE UP (ref 150–400)
PMV BLD: 10 FL — SIGNIFICANT CHANGE UP (ref 7–13)
POTASSIUM SERPL-MCNC: 3.7 MMOL/L — SIGNIFICANT CHANGE UP (ref 3.5–5.3)
POTASSIUM SERPL-SCNC: 3.7 MMOL/L — SIGNIFICANT CHANGE UP (ref 3.5–5.3)
RBC # BLD: 5.88 M/UL — HIGH (ref 4.2–5.8)
RBC # FLD: 16 % — HIGH (ref 10.3–14.5)
SODIUM SERPL-SCNC: 139 MMOL/L — SIGNIFICANT CHANGE UP (ref 135–145)
WBC # BLD: 5.58 K/UL — SIGNIFICANT CHANGE UP (ref 3.8–10.5)
WBC # FLD AUTO: 5.58 K/UL — SIGNIFICANT CHANGE UP (ref 3.8–10.5)

## 2017-06-25 RX ADMIN — Medication 25 MILLIGRAM(S): at 05:49

## 2017-06-25 RX ADMIN — Medication 50 MILLIGRAM(S): at 13:15

## 2017-06-25 RX ADMIN — AMLODIPINE BESYLATE 10 MILLIGRAM(S): 2.5 TABLET ORAL at 05:49

## 2017-06-25 RX ADMIN — HEPARIN SODIUM 5000 UNIT(S): 5000 INJECTION INTRAVENOUS; SUBCUTANEOUS at 13:15

## 2017-06-25 RX ADMIN — Medication 50 MILLIGRAM(S): at 21:20

## 2017-06-25 RX ADMIN — Medication 25 MILLIGRAM(S): at 17:13

## 2017-06-25 RX ADMIN — HEPARIN SODIUM 5000 UNIT(S): 5000 INJECTION INTRAVENOUS; SUBCUTANEOUS at 21:20

## 2017-06-25 RX ADMIN — Medication 50 MILLIGRAM(S): at 05:49

## 2017-06-25 RX ADMIN — HEPARIN SODIUM 5000 UNIT(S): 5000 INJECTION INTRAVENOUS; SUBCUTANEOUS at 05:49

## 2017-06-25 NOTE — PROGRESS NOTE ADULT - ASSESSMENT
66 yo M as above:  1. NSVT - Echo, Cardio f/u, c/w Metoprolol  2. HTN - BP better controlled, c/w medications  3, ? CKD - Cr improved, outpt Renal eval  4. DVT prophylaxis  5. Back pain - lumbar radiculopathy, pain control, outpt follow up  6. Pt would like to leave today and states will sign out AMA despite need of work up for NSVT being explained to him by me and Cardio team, pt understands the risks of signing out AMA including death.

## 2017-06-26 VITALS
RESPIRATION RATE: 18 BRPM | TEMPERATURE: 98 F | OXYGEN SATURATION: 97 % | SYSTOLIC BLOOD PRESSURE: 155 MMHG | DIASTOLIC BLOOD PRESSURE: 91 MMHG | HEART RATE: 75 BPM

## 2017-06-26 LAB
BUN SERPL-MCNC: 16 MG/DL — SIGNIFICANT CHANGE UP (ref 7–23)
CALCIUM SERPL-MCNC: 9.1 MG/DL — SIGNIFICANT CHANGE UP (ref 8.4–10.5)
CHLORIDE SERPL-SCNC: 100 MMOL/L — SIGNIFICANT CHANGE UP (ref 98–107)
CO2 SERPL-SCNC: 24 MMOL/L — SIGNIFICANT CHANGE UP (ref 22–31)
CREAT SERPL-MCNC: 1.34 MG/DL — HIGH (ref 0.5–1.3)
GLUCOSE SERPL-MCNC: 135 MG/DL — HIGH (ref 70–99)
HCT VFR BLD CALC: 45.8 % — SIGNIFICANT CHANGE UP (ref 39–50)
HGB BLD-MCNC: 14.1 G/DL — SIGNIFICANT CHANGE UP (ref 13–17)
MAGNESIUM SERPL-MCNC: 2.2 MG/DL — SIGNIFICANT CHANGE UP (ref 1.6–2.6)
MCHC RBC-ENTMCNC: 24.2 PG — LOW (ref 27–34)
MCHC RBC-ENTMCNC: 30.8 % — LOW (ref 32–36)
MCV RBC AUTO: 78.6 FL — LOW (ref 80–100)
PLATELET # BLD AUTO: 195 K/UL — SIGNIFICANT CHANGE UP (ref 150–400)
PMV BLD: 9.7 FL — SIGNIFICANT CHANGE UP (ref 7–13)
POTASSIUM SERPL-MCNC: 4 MMOL/L — SIGNIFICANT CHANGE UP (ref 3.5–5.3)
POTASSIUM SERPL-SCNC: 4 MMOL/L — SIGNIFICANT CHANGE UP (ref 3.5–5.3)
RBC # BLD: 5.83 M/UL — HIGH (ref 4.2–5.8)
RBC # FLD: 16.1 % — HIGH (ref 10.3–14.5)
SODIUM SERPL-SCNC: 139 MMOL/L — SIGNIFICANT CHANGE UP (ref 135–145)
WBC # BLD: 5.28 K/UL — SIGNIFICANT CHANGE UP (ref 3.8–10.5)
WBC # FLD AUTO: 5.28 K/UL — SIGNIFICANT CHANGE UP (ref 3.8–10.5)

## 2017-06-26 PROCEDURE — 93306 TTE W/DOPPLER COMPLETE: CPT | Mod: 26

## 2017-06-26 RX ORDER — METOPROLOL TARTRATE 50 MG
1 TABLET ORAL
Qty: 60 | Refills: 0 | OUTPATIENT
Start: 2017-06-26 | End: 2017-07-26

## 2017-06-26 RX ORDER — HYDRALAZINE HCL 50 MG
1 TABLET ORAL
Qty: 90 | Refills: 0 | OUTPATIENT
Start: 2017-06-26 | End: 2017-07-26

## 2017-06-26 RX ORDER — AMLODIPINE BESYLATE 2.5 MG/1
1 TABLET ORAL
Qty: 30 | Refills: 0 | OUTPATIENT
Start: 2017-06-26 | End: 2017-07-26

## 2017-06-26 RX ADMIN — Medication 25 MILLIGRAM(S): at 05:28

## 2017-06-26 RX ADMIN — Medication 50 MILLIGRAM(S): at 05:28

## 2017-06-26 RX ADMIN — Medication 50 MILLIGRAM(S): at 13:57

## 2017-06-26 RX ADMIN — HEPARIN SODIUM 5000 UNIT(S): 5000 INJECTION INTRAVENOUS; SUBCUTANEOUS at 05:28

## 2017-06-26 RX ADMIN — AMLODIPINE BESYLATE 10 MILLIGRAM(S): 2.5 TABLET ORAL at 05:28

## 2017-06-26 NOTE — PROGRESS NOTE ADULT - PROBLEM SELECTOR PLAN 1
better controlled  cont current meds
better controlled  cont current meds
F/u outpatient CT chest: in 6 months time: to ensure it is not increasing in time.
better controlled  cont current meds
F/u outpatient CT chest

## 2017-06-26 NOTE — PROGRESS NOTE ADULT - PROBLEM SELECTOR PROBLEM 2
CKD (chronic kidney disease)
Hypertensive urgency
CKD (chronic kidney disease)
CKD (chronic kidney disease)
Hypertensive urgency

## 2017-06-26 NOTE — DISCHARGE NOTE ADULT - PLAN OF CARE
Continue medications as currently prescribed. Follow up with your PMD for further management of disease. Dash diet. Prevent repeat cardiac arrythmia Please follow up with Dr. Darling as an outpatient, you will likely need a stress test to complete your work up. Continue your medications as prescribed. Follow up with Dr. Laguna as an outpatient to monitor pulmonary nodule. A repeat CT chest will be needed in 6 months.

## 2017-06-26 NOTE — PROGRESS NOTE ADULT - PROBLEM SELECTOR PLAN 2
fu with renal
fu with renal
Resolved. Management per primary team
fu with renal
Resolved. Management per primary team

## 2017-06-26 NOTE — DISCHARGE NOTE ADULT - MEDICATION SUMMARY - MEDICATIONS TO TAKE
I will START or STAY ON the medications listed below when I get home from the hospital:    metoprolol tartrate 25 mg oral tablet  -- 1 tab(s) by mouth 2 times a day  -- Indication: For HTN (hypertension)    amLODIPine 10 mg oral tablet  -- 1 tab(s) by mouth once a day  -- Indication: For HTN (hypertension)    hydrALAZINE 50 mg oral tablet  -- 1 tab(s) by mouth every 8 hours  -- Indication: For HTN (hypertension)

## 2017-06-26 NOTE — DISCHARGE NOTE ADULT - PATIENT PORTAL LINK FT
“You can access the [facility] Patient Portal, offered by [facility], by registering with the following website: [email or web address]”

## 2017-06-26 NOTE — PROGRESS NOTE ADULT - ASSESSMENT
66 yo Male as above:  1. NSVT - Echo, Cardio f/u, c/w Metoprolol  2. HTN - BP better controlled, c/w medications  3, ? CKD - Cr improved, outpt Renal eval  4. DVT prophylaxis  5. Back pain - lumbar radiculopathy, pain control, outpt follow up  d/c if cleared by cards

## 2017-06-26 NOTE — PROGRESS NOTE ADULT - PROBLEM SELECTOR PLAN 3
low dose bb  monitor HR  check echo and eventually needs ischemic work up
low dose bb  monitor HR  check echo and eventually needs ischemic work up
On heparin SQ
add low dose bb  monitor HR  check echo and eventually needs ischemic work up
On heparin SQ

## 2017-06-26 NOTE — DISCHARGE NOTE ADULT - HOSPITAL COURSE
Patient is a 68 y/o M PMH HTN not on medications for the last 5 years, presents with left sided lower back pain x 1 day. Patient states that pain is 10/10 in severity, sharp, with radiation the right lower back. No tingling or numbness in legs. Denies any recent trauma or fall. States he has had back pain for years, similar to this pain, but not as severe. No pain at rest, but movement exacerbates the pain. States he took some morphine, which helped with the pain. No fevers, chills, night sweats, chest pain, SOB, abdominal pain, nausea, vomiting, diarrhea or constipation. No dysuria. No headache, vision changes, or lower extremity edema. Otherwise feels well. States he took a medication 5 years ago for blood pressure, knows the dose was 5 mg but not sure of the name.      On admission:  CE neg x 2   CTA Chest/abd/pelvis: No aortic dissection.7 mm right lower lobe pulmonary nodule. A 6 to 12 month follow-up CT is  recommended to ascertain stability.  CXR:Fine strands of opacity in the peripheral right lower lung compatible   subsegmental hepatic change or scarring. Clear remaining visualized   lungs. No pleural effusions or pneumothorax.Slightly enlarged cardiac and mediastinal silhouettes. Trachea midline.Mild spinal degenerative changes. Unremarkable remaining visualized osseous structures.  6/23 Cardio : Recommendation: cont norvasc will hold off to bb for now given low HR will add hydralazine , keep BP between 150-170 systolic for now.check echo, check renal artery duplex rule out NATALIA.  6/24 Med: NSVT - Echo, Cardio f/u, c/w Metoprolol 2. HTN - BP uncontrolled, increase Norvasc to 10mg daily (give 5mg now) and Hydralazine to 50mg q8h, monitor BP 3. ? CKD - Cr improved, outpt Renal eval 4. DVT prophylaxis 5. Back pain - lumbar radiculopathy, pain control, outpt follow up Hypertensive urgency.  Plan: better controlled  cont current meds. CKD (chronic kidney disease).  Plan: fu with renal.   6/24 Cardio: NSVT: add low dose bb, monitor HR, check echo and eventually needs ischemic work up.  6/24 Neuro: Lumbago, Lumbar Radiculopathy: Patient stable at present. I advised outpatient neurology follow up for possible neuroimaging if symptoms persist.   6/24 Pulm: Pulmonary nodule: F/u outpatient CT chest  6/25 MED:  1. NSVT - Echo, Cardio f/u, c/w Metoprolol 2. HTN - BP better controlled, c/w medications 3, ? CKD - Cr improved, outpt Renal eval 5. Back pain - lumbar radiculopathy, pain control, outpt follow up . Pt would like to leave today and states will sign out AMA despite need of work up for NSVT being explained to him by me and Cardio team, pt understands the risks of signing out AMA including death.   6/25 CARDIO Franck: HTN Urgemcy--> Controlled w/ meds, CKD - renal f/up, NSVT - low dose BB- monitor HR,  check echo and eventually needs ischemic work up. Sinus Jaya nocturnal , suspect underlying CARLEY. outpt sleep study.      Echocardiogram was normal. Cleared for discharge with outpatient follow up to have stress test with Dr. Darling.

## 2017-06-26 NOTE — PROGRESS NOTE ADULT - PROBLEM SELECTOR PROBLEM 3
NSVT (nonsustained ventricular tachycardia)
Need for prophylactic measure
NSVT (nonsustained ventricular tachycardia)
NSVT (nonsustained ventricular tachycardia)
Need for prophylactic measure

## 2017-06-26 NOTE — DISCHARGE NOTE ADULT - CARE PROVIDER_API CALL
Zia Darling), Cardiovascular Disease; Internal Medicine  [address] [place] [address]  Phone: [phone]  Fax: [phone]    Vaughn Laguna), Critical Care Medicine; Internal Medicine; Pulmonary Disease; Sleep Medicine  [address] [place] [address]  Phone: [phone]  Fax: [phone]

## 2017-06-26 NOTE — PROGRESS NOTE ADULT - PROVIDER SPECIALTY LIST ADULT
Internal Medicine
Pulmonology
Pulmonology
Cardiology

## 2017-06-26 NOTE — PROGRESS NOTE ADULT - PROBLEM SELECTOR PLAN 4
nocturnal , suspect underlying CARLEY. outpt sleep study
nocturnal , suspect underlying CARLEY. outpt sleep study

## 2017-06-26 NOTE — DISCHARGE NOTE ADULT - CARE PLAN
Principal Discharge DX:	NSVT (nonsustained ventricular tachycardia)  Goal:	Prevent repeat cardiac arrythmia  Instructions for follow-up, activity and diet:	Please follow up with Dr. Darling as an outpatient, you will likely need a stress test to complete your work up. Continue your medications as prescribed.  Secondary Diagnosis:	Essential hypertension  Instructions for follow-up, activity and diet:	Continue medications as currently prescribed. Follow up with your PMD for further management of disease. Dash diet.  Secondary Diagnosis:	Pulmonary nodule  Instructions for follow-up, activity and diet:	Follow up with Dr. Laguna as an outpatient to monitor pulmonary nodule. A repeat CT chest will be needed in 6 months.

## 2017-06-26 NOTE — DISCHARGE NOTE ADULT - ADMISSION DATE +STARTOFVISITDATE
First name:       Marck                 MR # [id number]  Date of Birth: [birth year]  Time of Birth: 15:38    Birth Weight:  2630    Date of Admission:  17       Gestational Age: 38.5      Source of admission [ x] Inborn     [ __ ]Transport from    [facility]:  38.4 week GA female born to a 31 y/o   mother via . Maternal history signficant for methadone maintenance positive PCP on utox. Pregnancy with fetal alert for possible cardiac anomaly, Possible Ductal Arteriosis restriction, will need outpatient followup for atrial septum assessment secondary to sibling with ASD. mother had PTL early in pregnancy s/p cerclage ,  mother admitted with ROM, decreased fetal movement and vaginal bleeding r/o abruption.  Maternal blood type O+. Prenatal labs negative, nonreactive and immune. GBS negative on .  SROM <18hrs with clear fluid. Baby born vigorous and crying spontaneously. Warmed, dried, stimulated. Apgars 9/9.  Transferred to NICU for observation for resp distress /shallow breathing      Social History: Hx oxycodone abuse, last 1.5 years ago now on methadone. smoked during pregnancy   FHx: sibling with ASD   ROS: unable to obtain ()     Interval Events:    EVELINA scores low 0-2, improved po feeding    **************************************************************************************************  Age: 8d    Vital Signs:  T(C): 37 (17 @ 09:00), Max: 37 (17 @ 02:30)  HR: 142 (17 @ 09:00) (130 - 150)  BP: 71/46 (17 @ 09:00) (71/46 - 72/44)  BP(mean): 50 (17 @ 09:00) (50 - 50)  ABP: --  ABP(mean): --  RR: 48 (17 @ 09:00) (48 - 54)  SpO2: 99% (17 @ 09:00) (97% - 100%)  Height (cm): 46 ( @ 20:00)  Drug Dosing Weight: Weight (kg): 2.309 (20 Aug 2017 20:00)    MEDICATIONS:  MEDICATIONS  (STANDING):    MEDICATIONS  (PRN):      RESPIRATORY SUPPORT:  [ _ ] Mechanical Ventilation:   [ _ ] Nasal Cannula: _ __ _ Liters, FiO2: ___ %  [ _ ]RA    LABS:         Blood type, Baby [] ABO: O  Rh; Positive DC; Negative                            22.5   16.81 )-----------( 187             [ @ 21:20]                  63.3  S 82.0%  B 4.0%  Cora 0%  Myelo 0%  Promyelo 0%  Blasts 0%  Lymph 4.0%  Mono 8.0%  Eos 1.0%  Baso 0%  Retic 0%      Bili T/D  [ @ 02:20] - 12.2/0.3, Bili T/D  [ @ 03:15] - 12.9/0.3, Bili T/D  [ @ 14:30] - 15.9/0.3      CAPILLARY BLOOD GLUCOSE      *************************************************************************************************    ADDITIONAL LABS:  Utox pos for methadone    med tox  pendiing      CULTURES:  MRSA cx        IMAGING STUDIES:   hypoinflated, increased  cardiolthymic silhouette, AXR non-specific gas pattern       WEIGHT:   2309 +42  FLUIDS AND NUTRITION:   Intake(ml/kg/day):  161  Urine output:              x 8               Stools:  x 1    Diet - Enteral:  Diet - Parenteral:      WEEKLY DATA  Postmenstrual age:			Date:  Head Circumference:		32.5 	Date:     Weight gain: Gram/kg/day:		Date:  Weight gain: Gram/day:		Date:  Bruni percentile for weight:			Date:    PHYSICAL EXAM:  General:	         Awake and active; in no acute distress  Head:		AFO wide   Eyes:		Normally set bilaterally  Ears:		Patent bilaterally, no deformities  Nose/Mouth:	Nares patent, palate intact  Neck:		No masses, intact clavicles  Chest/Lungs:      Breath sounds equal to auscultation. No retractions  CV:		No murmurs appreciated, normal pulses bilaterally  Abdomen:          Soft nontender nondistended, no masses, bowel sounds present  :		Normal for gestational age  Spine:		Intact, no sacral dimples or tags  Anus:		Grossly patent  Extremities:	FROM, no hip clicks  Skin:		Pink, no lesions  Neuro exam:	Appropriate tone, activity    DISCHARGE PLANNING (date and status):  Hep B Vacc    CCHD:			  :	n/a 				  Hearing:    screen:	  Circumcision: n/a   Hip US rec: na/   	  Synagis: no 			  Other Immunizations (with dates):    		  Neurodevelop eval?	completed , but consult note pending   CPR class done?  	    VITD at DC?  PMD:          Name:  ______________ _             Contact information:  ______________ _  Pharmacy: Name:  ______________ _              Contact information:  ______________ _  Social FOB updated 8/15   Follow-up appointments (list):  PMD, cardiology       Time spent on the total subsequent encounter with >50% of the visit spent on counseling and/or coordination of care:[ _ ] 15 min[ _ ] 25 min[ _ ] 35 min  [ _ ] Discharge time spent >30 min Statement Selected

## 2017-06-26 NOTE — PROGRESS NOTE ADULT - SUBJECTIVE AND OBJECTIVE BOX
Subjective: Patient seen and examined. No new events except as noted.     SUBJECTIVE/ROS:      No chest pain, sob, dizziness, palpitation, nausea, vomiting, fever, diarrhea, constipation, syncope. All other systems reviewed and negative.   MEDICATIONS:  MEDICATIONS  (STANDING):  amLODIPine   Tablet 5milliGRAM(s) Oral daily  heparin  Injectable 5000Unit(s) SubCutaneous every 8 hours  hydrALAZINE 25milliGRAM(s) Oral three times a day      PHYSICAL EXAM:  T(C): 36.4, Max: 36.9 (06-23 @ 18:23)  HR: 65 (54 - 78)  BP: 175/105 (152/100 - 212/95)  RR: 17 (14 - 18)  SpO2: 100% (98% - 100%)  Wt(kg): --  I&O's Summary    I & Os for current day (as of 24 Jun 2017 10:54)  =============================================  IN: 220 ml / OUT: 0 ml / NET: 220 ml    Height (cm): 180.3 (06-23 @ 18:23)  Weight (kg): 115.3 (06-23 @ 18:23)  BMI (kg/m2): 35.5 (06-23 @ 18:23)  BSA (m2): 2.33 (06-23 @ 18:23)    Appearance: Normal	  HEENT:   Normal oral mucosa, PERRL, EOMI	  Cardiovascular: Normal S1 S2,    Murmur:   Neck: JVP normal  Respiratory: Lungs clear to auscultation  Gastrointestinal:  Soft, Non-tender, + BS	  Skin: normal   Musculoskeletal: Normal range of motion, normal strength  Psychiatry:  Mood & affect appropriate  Ext: No edema        LABS:    CARDIAC MARKERS:  CARDIAC MARKERS ( 23 Jun 2017 15:25 )  x     / < 0.06 ng/mL / 267 u/L / 2.04 ng/mL / x      CARDIAC MARKERS ( 23 Jun 2017 08:56 )  x     / < 0.06 ng/mL / 322 u/L / 2.16 ng/mL / x                                    14.0   5.70  )-----------( 193      ( 23 Jun 2017 08:56 )             45.1     06-23    145  |  104  |  13  ----------------------------<  119<H>  3.9   |  26  |  1.45<H>    Ca    9.2      23 Jun 2017 08:56  Phos  2.8     06-23  Mg     1.9     06-23    TPro  7.6  /  Alb  4.3  /  TBili  0.6  /  DBili  x   /  AST  28  /  ALT  14  /  AlkPhos  60  06-23    proBNP:   Lipid Profile:   HgA1c:   TSH:           TELEMETRY: 	  sinus, NSVT 4 beats   ECG:  	  RADIOLOGY:   DIAGNOSTIC TESTING:  Echocardiogram:  Catheterization:  Stress Test:    OTHER:
Subjective: Patient seen and examined. No new events except as noted.     SUBJECTIVE/ROS:    No chest pain, or sob.   The rest of 14 point ROS otherwise reviewed and reported unremarkable.   MEDICATIONS:  MEDICATIONS  (STANDING):  heparin  Injectable 5000Unit(s) SubCutaneous every 8 hours  metoprolol 25milliGRAM(s) Oral two times a day  amLODIPine   Tablet 10milliGRAM(s) Oral daily  hydrALAZINE 50milliGRAM(s) Oral every 8 hours      PHYSICAL EXAM:  T(C): 36.6, Max: 37.1 (06-24 @ 14:33)  HR: 63 (54 - 72)  BP: 161/98 (127/69 - 183/122)  RR: 18 (18 - 19)  SpO2: 100% (100% - 100%)  Wt(kg): --  I&O's Summary    I & Os for current day (as of 25 Jun 2017 10:29)  =============================================  IN: 240 ml / OUT: 0 ml / NET: 240 ml        Appearance: Normal	  HEENT:   Normal oral mucosa, PERRL, EOMI	  Cardiovascular: Normal S1 S2,    Murmur:   Neck: JVP normal  Respiratory: Lungs clear to auscultation  Gastrointestinal:  Soft, Non-tender, + BS	  Skin: normal   Musculoskeletal: Normal range of motion, normal strength  Psychiatry:  Mood & affect appropriate  Ext: No edema        LABS:    CARDIAC MARKERS:  CARDIAC MARKERS ( 23 Jun 2017 15:25 )  x     / < 0.06 ng/mL / 267 u/L / 2.04 ng/mL / x      CARDIAC MARKERS ( 23 Jun 2017 08:56 )  x     / < 0.06 ng/mL / 322 u/L / 2.16 ng/mL / x                                    14.2   5.58  )-----------( 186      ( 25 Jun 2017 06:00 )             46.2     06-25    139  |  99  |  15  ----------------------------<  107<H>  3.7   |  23  |  1.34<H>    Ca    9.2      25 Jun 2017 06:00  Mg     2.1     06-25      proBNP:   Lipid Profile:   HgA1c:   TSH:           TELEMETRY: 	  sinus, sinus lilia, v bigeminy   ECG:  	  RADIOLOGY:   DIAGNOSTIC TESTING:  Echocardiogram:  Catheterization:  Stress Test:    OTHER:
Subjective: Patient seen and examined. No new events except as noted.     SUBJECTIVE/ROS:  No chest pain, or sob.   The rest of 14 point ROS otherwise reviewed and reported unremarkable.     MEDICATIONS:  MEDICATIONS  (STANDING):  heparin  Injectable 5000Unit(s) SubCutaneous every 8 hours  metoprolol 25milliGRAM(s) Oral two times a day  amLODIPine   Tablet 10milliGRAM(s) Oral daily  hydrALAZINE 50milliGRAM(s) Oral every 8 hours      PHYSICAL EXAM:  T(C): 36.8, Max: 36.9 (06-25 @ 13:11)  HR: 71 (59 - 74)  BP: 157/105 (137/92 - 179/112)  RR: 18 (17 - 18)  SpO2: 100% (100% - 100%)  Wt(kg): --  I&O's Summary  I & Os for 24h ending 26 Jun 2017 07:00  =============================================  IN: 220 ml / OUT: 0 ml / NET: 220 ml    I & Os for current day (as of 26 Jun 2017 10:55)  =============================================  IN: 320 ml / OUT: 0 ml / NET: 320 ml        Appearance: Normal	  HEENT:   Normal oral mucosa, PERRL, EOMI	  Cardiovascular: Normal S1 S2,    Murmur:   Neck: JVP normal  Respiratory: Lungs clear to auscultation  Gastrointestinal:  Soft, Non-tender, + BS	  Skin: normal   Neuro: No gross deficits.   Psychiatry:  Mood & affect appropriate  Ext: No edema        LABS:    CARDIAC MARKERS:  CARDIAC MARKERS ( 23 Jun 2017 15:25 )  x     / < 0.06 ng/mL / 267 u/L / 2.04 ng/mL / x                                    14.1   5.28  )-----------( 195      ( 26 Jun 2017 05:50 )             45.8     06-26    139  |  100  |  16  ----------------------------<  135<H>  4.0   |  24  |  1.34<H>    Ca    9.1      26 Jun 2017 05:50  Mg     2.2     06-26      proBNP:   Lipid Profile:   HgA1c:   TSH:           TELEMETRY: 	    ECG:  	  RADIOLOGY:   DIAGNOSTIC TESTING:  Echocardiogram:  Catheterization:  Stress Test:    OTHER:
CHIEF COMPLAINT:Patient is a 67y old  Male who presents with a chief complaint of back pain (23 Jun 2017 18:27)    	  Interval history: back pain resolved      Allergies:  No Known Allergies      PAST MEDICAL & SURGICAL HISTORY:  HTN (hypertension)  No significant past surgical history      FAMILY HISTORY:  No pertinent family history in first degree relatives      REVIEW OF SYSTEMS:  CONSTITUTIONAL: No fever, weight loss, or fatigue  EYES: No eye pain, visual disturbances, or discharge  NECK: No pain or stiffness  RESPIRATORY: No cough or wheezing, no shortness of breath  CARDIOVASCULAR: No chest pain, palpitations, dizziness, or leg swelling  GASTROINTESTINAL: No abdominal or epigastric pain. No nausea, vomiting, diarrhea or constipation  GENITOURINARY: No dysuria, urinary frequency or urgency, no hematuria  NEUROLOGICAL: No headaches, memory loss, loss of strength, numbness, or tremors  SKIN: No itching, burning, rashes, or lesions   MUSCULOSKELETAL: No joint pain or swelling; No muscle, back, or extremity pain      Medications:  MEDICATIONS  (STANDING):  heparin  Injectable 5000Unit(s) SubCutaneous every 8 hours  metoprolol 25milliGRAM(s) Oral two times a day  amLODIPine   Tablet 10milliGRAM(s) Oral daily  hydrALAZINE 50milliGRAM(s) Oral every 8 hours    MEDICATIONS  (PRN):  acetaminophen   Tablet. 650milliGRAM(s) Oral every 6 hours PRN Mild Pain (1 - 3)  traMADol 50milliGRAM(s) Oral every 6 hours PRN moderate or severe pain  hydrALAZINE Injectable 10milliGRAM(s) IV Push four times a day PRN systolic greater than 185 mmHg    	    PHYSICAL EXAM:  T(C): 36.8, Max: 36.9 (06-25 @ 13:11)  HR: 74 (54 - 74)  BP: 179/112 (127/69 - 179/112)  RR: 17 (17 - 18)  SpO2: 100% (100% - 100%)  Wt(kg): --  I&O's Summary    I & Os for current day (as of 25 Jun 2017 17:45)  =============================================  IN: 240 ml / OUT: 0 ml / NET: 240 ml      Appearance: Normal	  HEENT:   NCAT, PERRL, EOMI	  Lymphatic: No lymphadenopathy  Cardiovascular: Normal S1 S2, RRR  Respiratory: Lungs clear to auscultation BL  Psychiatry: A & O x 3, Mood & affect appropriate  Gastrointestinal:  Soft, Non-tender, + BS  Skin: No rashes, No ecchymoses, No cyanosis	  Neurologic: Non-focal  Extremities: Normal range of motion, No clubbing, cyanosis or edema    	  LABS:	 	    CARDIAC MARKERS:                                14.2   5.58  )-----------( 186      ( 25 Jun 2017 06:00 )             46.2     06-25    139  |  99  |  15  ----------------------------<  107<H>  3.7   |  23  |  1.34<H>    Ca    9.2      25 Jun 2017 06:00  Mg     2.1     06-25
CHIEF COMPLAINT:Patient is a 67y old  Male who presents with a chief complaint of back pain (23 Jun 2017 18:27)    	  Interval history: back pain resolved      Allergies:  No Known Allergies      PAST MEDICAL & SURGICAL HISTORY:  HTN (hypertension)  No significant past surgical history      FAMILY HISTORY:  No pertinent family history in first degree relatives      REVIEW OF SYSTEMS:  CONSTITUTIONAL: No fever, weight loss, or fatigue  EYES: No eye pain, visual disturbances, or discharge  NECK: No pain or stiffness  RESPIRATORY: No cough or wheezing, no shortness of breath  CARDIOVASCULAR: No chest pain, palpitations, dizziness, or leg swelling  GASTROINTESTINAL: No abdominal or epigastric pain. No nausea, vomiting, diarrhea or constipation  GENITOURINARY: No dysuria, urinary frequency or urgency, no hematuria  NEUROLOGICAL: No headaches, memory loss, loss of strength, numbness, or tremors  SKIN: No itching, burning, rashes, or lesions   MUSCULOSKELETAL: No joint pain or swelling; No muscle, back, or extremity pain    Medications:  MEDICATIONS  (STANDING):  heparin  Injectable 5000Unit(s) SubCutaneous every 8 hours  hydrALAZINE 25milliGRAM(s) Oral three times a day  metoprolol 25milliGRAM(s) Oral two times a day    MEDICATIONS  (PRN):  acetaminophen   Tablet. 650milliGRAM(s) Oral every 6 hours PRN Mild Pain (1 - 3)  traMADol 50milliGRAM(s) Oral every 6 hours PRN moderate or severe pain  hydrALAZINE Injectable 10milliGRAM(s) IV Push four times a day PRN systolic greater than 185 mmHg    	    PHYSICAL EXAM:  T(C): 37.1, Max: 37.1 (06-24 @ 14:33)  HR: 71 (60 - 78)  BP: 180/115 (152/100 - 194/109)  RR: 18 (14 - 19)  SpO2: 100% (98% - 100%)  Wt(kg): --  I&O's Summary    I & Os for current day (as of 24 Jun 2017 14:46)  =============================================  IN: 220 ml / OUT: 0 ml / NET: 220 ml      Appearance: Normal	  HEENT:   NCAT, PERRL, EOMI	  Lymphatic: No lymphadenopathy  Cardiovascular: Normal S1 S2, RRR  Respiratory: Lungs clear to auscultation BL  Psychiatry: A & O x 3, Mood & affect appropriate  Gastrointestinal:  Soft, Non-tender, + BS  Skin: No rashes, No ecchymoses, No cyanosis	  Neurologic: Non-focal  Extremities: Normal range of motion, No clubbing, cyanosis or edema    	  LABS:	 	    CARDIAC MARKERS:  CARDIAC MARKERS ( 23 Jun 2017 15:25 )  x     / < 0.06 ng/mL / 267 u/L / 2.04 ng/mL / x      CARDIAC MARKERS ( 23 Jun 2017 08:56 )  x     / < 0.06 ng/mL / 322 u/L / 2.16 ng/mL / x                                    14.3   4.96  )-----------( 199      ( 24 Jun 2017 10:47 )             46.1     06-24    142  |  102  |  11  ----------------------------<  141<H>  3.4<L>   |  25  |  1.24    Ca    9.3      24 Jun 2017 10:47  Phos  2.8     06-23  Mg     2.0     06-24    TPro  7.6  /  Alb  4.3  /  TBili  0.6  /  DBili  x   /  AST  28  /  ALT  14  /  AlkPhos  60  06-23
CHIEF COMPLAINT:Patient is a 67y old  Male who presents with a chief complaint of back pain (23 Jun 2017 18:27)    	pt feeling better        PAST MEDICAL & SURGICAL HISTORY:  HTN (hypertension)  No significant past surgical history          REVIEW OF SYSTEMS:  CONSTITUTIONAL: No fever, weight loss, or fatigue  EYES: No eye pain, visual disturbances, or discharge  NECK: No pain or stiffness  RESPIRATORY: No cough, wheezing, chills or hemoptysis; No Shortness of Breath  CARDIOVASCULAR: No chest pain, palpitations, passing out, dizziness, or leg swelling  GASTROINTESTINAL: No abdominal or epigastric pain. No nausea, vomiting, or hematemesis; No diarrhea or constipation. No melena or hematochezia.  GENITOURINARY: No dysuria, frequency, hematuria, or incontinence  NEUROLOGICAL: No headaches, memory loss, loss of strength, numbness, or tremors  SKIN: No itching, burning, rashes, or lesions   LYMPH Nodes: No enlarged glands  ENDOCRINE: No heat or cold intolerance; No hair loss  MUSCULOSKELETAL: No joint pain or swelling; No muscle, back, or extremity pain    Medications:  MEDICATIONS  (STANDING):  heparin  Injectable 5000Unit(s) SubCutaneous every 8 hours  metoprolol 25milliGRAM(s) Oral two times a day  amLODIPine   Tablet 10milliGRAM(s) Oral daily  hydrALAZINE 50milliGRAM(s) Oral every 8 hours    MEDICATIONS  (PRN):  acetaminophen   Tablet. 650milliGRAM(s) Oral every 6 hours PRN Mild Pain (1 - 3)  traMADol 50milliGRAM(s) Oral every 6 hours PRN moderate or severe pain  hydrALAZINE Injectable 10milliGRAM(s) IV Push four times a day PRN systolic greater than 185 mmHg    	    PHYSICAL EXAM:  T(C): 36.8, Max: 36.9 (06-25 @ 13:11)  HR: 71 (59 - 74)  BP: 157/105 (137/92 - 179/112)  RR: 18 (17 - 18)  SpO2: 100% (100% - 100%)  Wt(kg): --  I&O's Summary  I & Os for 24h ending 26 Jun 2017 07:00  =============================================  IN: 220 ml / OUT: 0 ml / NET: 220 ml    I & Os for current day (as of 26 Jun 2017 11:26)  =============================================  IN: 320 ml / OUT: 0 ml / NET: 320 ml      Appearance: Normal	  HEENT:   Normal oral mucosa, PERRL, EOMI	  Lymphatic: No lymphadenopathy  Cardiovascular: Normal S1 S2, No JVD, No murmurs, No edema  Respiratory: Lungs clear to auscultation	  Psychiatry: A & O x 3, Mood & affect appropriate  Gastrointestinal:  Soft, Non-tender, + BS	  Skin: No rashes, No ecchymoses, No cyanosis	  Neurologic: Non-focal  Extremities: Normal range of motion, No clubbing, cyanosis or edema  Vascular: Peripheral pulses palpable 2+ bilaterally    TELEMETRY: 	    ECG:  	  RADIOLOGY:  OTHER: 	  	  LABS:	 	    CARDIAC MARKERS:                                14.1   5.28  )-----------( 195      ( 26 Jun 2017 05:50 )             45.8     06-26    139  |  100  |  16  ----------------------------<  135<H>  4.0   |  24  |  1.34<H>    Ca    9.1      26 Jun 2017 05:50  Mg     2.2     06-26      proBNP:   Lipid Profile:   HgA1c:   TSH:
Patient is a 67y old  Male who presents with a chief complaint of back pain (23 Jun 2017 18:27)      Pertinent ROS: Denies SOB/cough/sputum     MEDICATIONS  (STANDING):  heparin  Injectable 5000Unit(s) SubCutaneous every 8 hours  hydrALAZINE 25milliGRAM(s) Oral three times a day  metoprolol 25milliGRAM(s) Oral two times a day    MEDICATIONS  (PRN):  acetaminophen   Tablet. 650milliGRAM(s) Oral every 6 hours PRN Mild Pain (1 - 3)  traMADol 50milliGRAM(s) Oral every 6 hours PRN moderate or severe pain  hydrALAZINE Injectable 10milliGRAM(s) IV Push four times a day PRN systolic greater than 185 mmHg    Vital Signs Last 24 Hrs  T(C): 36.8, Max: 36.9 (06-23 @ 18:23)  T(F): 98.3, Max: 98.4 (06-23 @ 18:23)  HR: 72 (60 - 78)  BP: 183/122 (152/100 - 203/110)  BP(mean): 121 (121 - 131)  RR: 19 (14 - 19)  SpO2: 100% (98% - 100%)    I&O's Summary    I & Os for current day (as of 24 Jun 2017 13:48)  =============================================  IN: 220 ml / OUT: 0 ml / NET: 220 ml      Physical Exam:   Const: NAD  Respiratory: CTA bilat, resp even unlabored  CVS: S1, S2 soft systolic murmurs, no edema over exts  GI: abd soft and nontender  Neuro: Awake, alert, follows commends/answers appropriately  SKIN: normal color, turgor, dry and intact  : No ordaz    Labs:    CARDIAC MARKERS ( 23 Jun 2017 15:25 )  x     / < 0.06 ng/mL / 267 u/L / 2.04 ng/mL / x      CARDIAC MARKERS ( 23 Jun 2017 08:56 )  x     / < 0.06 ng/mL / 322 u/L / 2.16 ng/mL / x                                14.3   4.96  )-----------( 199      ( 24 Jun 2017 10:47 )             46.1     06-24    142  |  102  |  11  ----------------------------<  141<H>  3.4<L>   |  25  |  1.24    Ca    9.3      24 Jun 2017 10:47  Phos  2.8     06-23  Mg     2.0     06-24    TPro  7.6  /  Alb  4.3  /  TBili  0.6  /  DBili  x   /  AST  28  /  ALT  14  /  AlkPhos  60  06-23    CAPILLARY BLOOD GLUCOSE    LIVER FUNCTIONS - ( 23 Jun 2017 08:56 )  Alb: 4.3 g/dL / Pro: 7.6 g/dL / ALK PHOS: 60 u/L / ALT: 14 u/L / AST: 28 u/L / GGT: x           PT/INR - ( 23 Jun 2017 08:56 )   PT: 10.8 SEC;   INR: 0.96          PTT - ( 23 Jun 2017 08:56 )  PTT:28.3 SEC    D DImer  Cultures    Studies  Chest X-RAY   EXAM:  RAD CHEST PA LAT        PROCEDURE DATE:  Jun 23 2017         INTERPRETATION:  CLINICAL INDICATION: back pain; evaluate for pneumonia    EXAM:  Upright frontal and lateral chest from 6/23/2017 at 0918 hrs. No prior   chest x-ray available at this institution for comparison.    IMPRESSION:  Fine strands of opacity in the peripheral right lower lung compatible   subsegmental hepatic change or scarring. Clear remaining visualized   lungs. No pleural effusions or pneumothorax.    Slightly enlarged cardiac and mediastinal silhouettes.     Trachea midline.    Mild spinal degenerative changes. Unremarkable remaining visualized   osseous structures.    Also correlate with findings on subsequently performed chest CTA.    CT SCAN Chest     EXAM:  CT ANGIO ABD PELV (W)AW IC      EXAM:  CT ANGIO CHEST (W)AW IC        PROCEDURE DATE:  Jun 23 2017         INTERPRETATION:  CLINICAL INFORMATION: Left back pain radiating to both   legs. Hypertension.    COMPARISON: None.    PROCEDURE:   CTAof the Chest, Abdomen and Pelvis was performed.   Precontrast imaging was performed through the chest followed by arterial   phase imaging of the chest, abdomen and pelvis.  Intravenous contrast: 90 ml Omnipaque 350. 10 ml discarded.  Oral contrast:None.  Sagittal and coronal reformats were performed as well as 3D   Reconstructions.    FINDINGS:    CHEST:     LUNGS AND LARGE AIRWAYS: Patent central airways. Bibasilar linear   atelectasis. There is a 7 mm solid pulmonary nodule in the superior   segment of right lower lobe (3, 52).  PLEURA: No pleural effusion.  VESSELS: The thoracic aorta is normal in size without evidence of wall   thickening, intramural hematoma, or dissection.  HEART: Heart size is normal. Coronary artery calcifications. No   pericardial effusion.  MEDIASTINUM AND PHILIP: No lymphadenopathy.  CHEST WALL AND LOWER NECK: Within normal limits.    ABDOMEN AND PELVIS:    LIVER: Within normal limits.  BILE DUCTS: Normal caliber.  GALLBLADDER: Within normal limits.  SPLEEN: Within normal limits.  PANCREAS: Within normal limits.  ADRENALS: Within normal limits.  KIDNEYS/URETERS: Right renal cyst.    BLADDER: Within normal limits.  REPRODUCTIVE ORGANS: The prostate is within normal limits.    BOWEL: No bowel obstruction. Normal appendix.  PERITONEUM: No ascites.  VESSELS:  The abdominal aorta is normal in size without evidence of wall   thickening or dissection.  The celiac axis, superior mesenteric artery,   bilateral main renal arteries and inferior mesenteric artery patent   without evidence of stenosis.  The common/internal/external iliac   arteries and proximal thigh vasculature are patent without evidence of   stenosis. Minimal atheromatous calcifications.  RETROPERITONEUM: No lymphadenopathy.    ABDOMINAL WALL:Within normal limits.  BONES: Degenerative changes of the spine.    IMPRESSION:    No aortic dissection.    7 mm right lower lobe pulmonary nodule. A 6 to 12 month follow-up CT is   recommended to ascertain stability.    CT Abdomen  Venous Dopplers: LE:   Others
Patient is a 67y old  Male who presents with a chief complaint of back pain (23 Jun 2017 18:27)    no symptoms       Any change in ROS:     MEDICATIONS  (STANDING):  heparin  Injectable 5000Unit(s) SubCutaneous every 8 hours  metoprolol 25milliGRAM(s) Oral two times a day  amLODIPine   Tablet 10milliGRAM(s) Oral daily  hydrALAZINE 50milliGRAM(s) Oral every 8 hours    MEDICATIONS  (PRN):  acetaminophen   Tablet. 650milliGRAM(s) Oral every 6 hours PRN Mild Pain (1 - 3)  traMADol 50milliGRAM(s) Oral every 6 hours PRN moderate or severe pain  hydrALAZINE Injectable 10milliGRAM(s) IV Push four times a day PRN systolic greater than 185 mmHg    Vital Signs Last 24 Hrs  T(C): 36.8, Max: 36.8 (06-25 @ 17:12)  T(F): 98.3, Max: 98.3 (06-26 @ 05:21)  HR: 71 (59 - 74)  BP: 157/105 (137/92 - 179/112)  BP(mean): --  RR: 18 (17 - 18)  SpO2: 100% (100% - 100%)    I&O's Summary  I & Os for 24h ending 26 Jun 2017 07:00  =============================================  IN: 220 ml / OUT: 0 ml / NET: 220 ml    I & Os for current day (as of 26 Jun 2017 13:28)  =============================================  IN: 320 ml / OUT: 0 ml / NET: 320 ml        Physical Exam:   GENERAL: NAD, well-groomed, well-developed  HEENT: ZIA/   Atraumatic, Normocephalic  ENMT: No tonsillar erythema, exudates, or enlargement; Moist mucous membranes, Good dentition, No lesions  NECK: Supple, No JVD, Normal thyroid  CHEST/LUNG: Clear to percussion bilaterally; No rales, rhonchi, wheezing, or rubs  CVS: Regular rate and rhythm; No murmurs, rubs, or gallops  GI: : Soft, Nontender, Nondistended; Bowel sounds present  NERVOUS SYSTEM:  Alert & Oriented X3, Good concentration; Motor Strength 5/5 B/L upper and lower extremities; DTRs 2+ intact and symmetric  EXTREMITIES:  2+ Peripheral Pulses, No clubbing, cyanosis, or edema  LYMPH: No lymphadenopathy noted  SKIN: No rashes or lesions  ENDOCRINOLOGY: No Thyromegaly  PSYCH: Appropriate    Labs:                              14.1   5.28  )-----------( 195      ( 26 Jun 2017 05:50 )             45.8                         14.2   5.58  )-----------( 186      ( 25 Jun 2017 06:00 )             46.2                         14.3   4.96  )-----------( 199      ( 24 Jun 2017 10:47 )             46.1                         14.0   5.70  )-----------( 193      ( 23 Jun 2017 08:56 )             45.1     06-26    139  |  100  |  16  ----------------------------<  135<H>  4.0   |  24  |  1.34<H>  06-25    139  |  99  |  15  ----------------------------<  107<H>  3.7   |  23  |  1.34<H>  06-24    142  |  102  |  11  ----------------------------<  141<H>  3.4<L>   |  25  |  1.24  06-23    145  |  104  |  13  ----------------------------<  119<H>  3.9   |  26  |  1.45<H>    Ca    9.1      26 Jun 2017 05:50  Ca    9.2      25 Jun 2017 06:00  Mg     2.2     06-26  Mg     2.1     06-25    TPro  7.6  /  Alb  4.3  /  TBili  0.6  /  DBili  x   /  AST  28  /  ALT  14  /  AlkPhos  60  06-23        Studies  Chest X-RAY  CT SCAN Chest OWEL: No bowel obstruction. Normal appendix.  PERITONEUM: No ascites.  VESSELS:  The abdominal aorta is normal in size without evidence of wall   thickening or dissection.  The celiac axis, superior mesenteric artery,   bilateral main renal arteries and inferior mesenteric artery patent   without evidence of stenosis.  The common/internal/external iliac   arteries and proximal thigh vasculature are patent without evidence of   stenosis. Minimal atheromatous calcifications.  RETROPERITONEUM: No lymphadenopathy.    ABDOMINAL WALL:Within normal limits.  BONES: Degenerative changes of the spine.    IMPRESSION:    No aortic dissection.    7 mm right lower lobe pulmonary nodule. A 6 to 12 month follow-up CT is   recommended to ascertain stability.  Venous Dopplers: LE:   CT Abdomen  Others

## 2018-05-04 ENCOUNTER — EMERGENCY (EMERGENCY)
Facility: HOSPITAL | Age: 68
LOS: 1 days | Discharge: HOME | End: 2018-05-04
Attending: EMERGENCY MEDICINE | Admitting: EMERGENCY MEDICINE
Payer: MEDICARE

## 2018-05-04 VITALS
TEMPERATURE: 99 F | SYSTOLIC BLOOD PRESSURE: 138 MMHG | HEART RATE: 58 BPM | OXYGEN SATURATION: 99 % | DIASTOLIC BLOOD PRESSURE: 88 MMHG | RESPIRATION RATE: 16 BRPM

## 2018-05-04 VITALS
SYSTOLIC BLOOD PRESSURE: 144 MMHG | HEART RATE: 56 BPM | TEMPERATURE: 98 F | OXYGEN SATURATION: 100 % | DIASTOLIC BLOOD PRESSURE: 85 MMHG | RESPIRATION RATE: 16 BRPM

## 2018-05-04 PROBLEM — I10 ESSENTIAL (PRIMARY) HYPERTENSION: Chronic | Status: ACTIVE | Noted: 2017-06-23

## 2018-05-04 PROCEDURE — 99283 EMERGENCY DEPT VISIT LOW MDM: CPT

## 2018-05-04 PROCEDURE — 72100 X-RAY EXAM L-S SPINE 2/3 VWS: CPT | Mod: 26

## 2018-05-04 RX ORDER — IBUPROFEN 200 MG
600 TABLET ORAL ONCE
Qty: 0 | Refills: 0 | Status: COMPLETED | OUTPATIENT
Start: 2018-05-04 | End: 2018-05-04

## 2018-05-04 RX ADMIN — Medication 600 MILLIGRAM(S): at 10:46

## 2018-05-04 NOTE — ED PROVIDER NOTE - OBJECTIVE STATEMENT
68M h/o HTN, low back pain. c/o slight increase in lumbar pain. bilateral, worse on left. occasional pain to upper legs. no fever, no loss of sensation/motorpower/bladder or bowel control. No recent trauma. Review of Symptoms in all systems otherwise negative, except as indicated

## 2018-05-04 NOTE — ED ADULT TRIAGE NOTE - CHIEF COMPLAINT QUOTE
pt c/o chronic lower back pain that worsened this morning. pt requesting an xray. denies any fall or trauma, denies any urinary symptoms. denies any fevers pmhx: htn

## 2018-05-04 NOTE — ED PROVIDER NOTE - CARDIAC, MLM
Normal rate, regular rhythm.  Heart sounds S1, S2.  No murmurs, rubs or gallops. Pulses normal and equal bilaterally

## 2018-05-04 NOTE — ED PROVIDER NOTE - PROGRESS NOTE DETAILS
DC instructions: Apply heat as needed. Take Ibuprofen 400mg to 600mg with food up to every 6-8 hours as needed for pain. You can apply Lidoderm patch to area daily. Return to ER at once if you get fever, lose control of bladder or bowel or get numbness or weakness in legs. Follow up at [facility] Tel: [phone] SPINE Pain improved. Xray result reviewed.   DC instructions: Apply heat as needed. Take Ibuprofen 400mg to 600mg with food up to every 6-8 hours as needed for pain. You can apply Lidoderm patch to area daily. Return to ER at once if you get fever, lose control of bladder or bowel or get numbness or weakness in legs. Follow up at [facility] Tel: [phone] SPINE

## 2020-12-19 NOTE — ED PROVIDER NOTE - MEDICAL DECISION MAKING DETAILS
Low back pain in 68M, chronic with slight excasserbation, normal exam, no red flags. Plan: NSAID, LBP instructions, warned of danger signs, Xray LS Spins, refer to spine center
normal...

## 2021-06-25 ENCOUNTER — EMERGENCY (EMERGENCY)
Facility: HOSPITAL | Age: 71
LOS: 1 days | Discharge: HOME | End: 2021-06-25
Attending: EMERGENCY MEDICINE | Admitting: EMERGENCY MEDICINE
Payer: MEDICARE

## 2021-06-25 VITALS
SYSTOLIC BLOOD PRESSURE: 129 MMHG | RESPIRATION RATE: 17 BRPM | OXYGEN SATURATION: 100 % | DIASTOLIC BLOOD PRESSURE: 74 MMHG | TEMPERATURE: 98 F | HEART RATE: 69 BPM

## 2021-06-25 VITALS
HEART RATE: 84 BPM | SYSTOLIC BLOOD PRESSURE: 122 MMHG | TEMPERATURE: 98 F | RESPIRATION RATE: 16 BRPM | HEIGHT: 71 IN | OXYGEN SATURATION: 96 % | DIASTOLIC BLOOD PRESSURE: 66 MMHG

## 2021-06-25 LAB
ALBUMIN SERPL ELPH-MCNC: 4 G/DL — SIGNIFICANT CHANGE UP (ref 3.3–5)
ALP SERPL-CCNC: 68 U/L — SIGNIFICANT CHANGE UP (ref 40–120)
ALT FLD-CCNC: 20 U/L — SIGNIFICANT CHANGE UP (ref 4–41)
ANION GAP SERPL CALC-SCNC: 12 MMOL/L — SIGNIFICANT CHANGE UP (ref 7–14)
AST SERPL-CCNC: 28 U/L — SIGNIFICANT CHANGE UP (ref 4–40)
BASOPHILS # BLD AUTO: 0.03 K/UL — SIGNIFICANT CHANGE UP (ref 0–0.2)
BASOPHILS NFR BLD AUTO: 0.5 % — SIGNIFICANT CHANGE UP (ref 0–2)
BILIRUB SERPL-MCNC: 0.6 MG/DL — SIGNIFICANT CHANGE UP (ref 0.2–1.2)
BUN SERPL-MCNC: 19 MG/DL — SIGNIFICANT CHANGE UP (ref 7–23)
CALCIUM SERPL-MCNC: 9.5 MG/DL — SIGNIFICANT CHANGE UP (ref 8.4–10.5)
CHLORIDE SERPL-SCNC: 103 MMOL/L — SIGNIFICANT CHANGE UP (ref 98–107)
CO2 SERPL-SCNC: 23 MMOL/L — SIGNIFICANT CHANGE UP (ref 22–31)
CREAT SERPL-MCNC: 1.72 MG/DL — HIGH (ref 0.5–1.3)
EOSINOPHIL # BLD AUTO: 0.02 K/UL — SIGNIFICANT CHANGE UP (ref 0–0.5)
EOSINOPHIL NFR BLD AUTO: 0.3 % — SIGNIFICANT CHANGE UP (ref 0–6)
GLUCOSE SERPL-MCNC: 185 MG/DL — HIGH (ref 70–99)
HCT VFR BLD CALC: 49.5 % — SIGNIFICANT CHANGE UP (ref 39–50)
HGB BLD-MCNC: 14.8 G/DL — SIGNIFICANT CHANGE UP (ref 13–17)
IANC: 4.39 K/UL — SIGNIFICANT CHANGE UP (ref 1.5–8.5)
IMM GRANULOCYTES NFR BLD AUTO: 0.5 % — SIGNIFICANT CHANGE UP (ref 0–1.5)
LYMPHOCYTES # BLD AUTO: 1.07 K/UL — SIGNIFICANT CHANGE UP (ref 1–3.3)
LYMPHOCYTES # BLD AUTO: 17.3 % — SIGNIFICANT CHANGE UP (ref 13–44)
MCHC RBC-ENTMCNC: 23.4 PG — LOW (ref 27–34)
MCHC RBC-ENTMCNC: 29.9 GM/DL — LOW (ref 32–36)
MCV RBC AUTO: 78.2 FL — LOW (ref 80–100)
MONOCYTES # BLD AUTO: 0.63 K/UL — SIGNIFICANT CHANGE UP (ref 0–0.9)
MONOCYTES NFR BLD AUTO: 10.2 % — SIGNIFICANT CHANGE UP (ref 2–14)
NEUTROPHILS # BLD AUTO: 4.39 K/UL — SIGNIFICANT CHANGE UP (ref 1.8–7.4)
NEUTROPHILS NFR BLD AUTO: 71.2 % — SIGNIFICANT CHANGE UP (ref 43–77)
NRBC # BLD: 0 /100 WBCS — SIGNIFICANT CHANGE UP
NRBC # FLD: 0 K/UL — SIGNIFICANT CHANGE UP
PLATELET # BLD AUTO: 226 K/UL — SIGNIFICANT CHANGE UP (ref 150–400)
POTASSIUM SERPL-MCNC: 4 MMOL/L — SIGNIFICANT CHANGE UP (ref 3.5–5.3)
POTASSIUM SERPL-SCNC: 4 MMOL/L — SIGNIFICANT CHANGE UP (ref 3.5–5.3)
PROT SERPL-MCNC: 7.4 G/DL — SIGNIFICANT CHANGE UP (ref 6–8.3)
RBC # BLD: 6.33 M/UL — HIGH (ref 4.2–5.8)
RBC # FLD: 17.6 % — HIGH (ref 10.3–14.5)
SODIUM SERPL-SCNC: 138 MMOL/L — SIGNIFICANT CHANGE UP (ref 135–145)
WBC # BLD: 6.17 K/UL — SIGNIFICANT CHANGE UP (ref 3.8–10.5)
WBC # FLD AUTO: 6.17 K/UL — SIGNIFICANT CHANGE UP (ref 3.8–10.5)

## 2021-06-25 PROCEDURE — 99284 EMERGENCY DEPT VISIT MOD MDM: CPT | Mod: 25,GC

## 2021-06-25 PROCEDURE — 93010 ELECTROCARDIOGRAM REPORT: CPT | Mod: GC

## 2021-06-25 RX ORDER — SODIUM CHLORIDE 9 MG/ML
1000 INJECTION INTRAMUSCULAR; INTRAVENOUS; SUBCUTANEOUS ONCE
Refills: 0 | Status: COMPLETED | OUTPATIENT
Start: 2021-06-25 | End: 2021-06-25

## 2021-06-25 RX ORDER — MECLIZINE HCL 12.5 MG
25 TABLET ORAL ONCE
Refills: 0 | Status: COMPLETED | OUTPATIENT
Start: 2021-06-25 | End: 2021-06-25

## 2021-06-25 RX ADMIN — Medication 25 MILLIGRAM(S): at 12:50

## 2021-06-25 RX ADMIN — SODIUM CHLORIDE 1000 MILLILITER(S): 9 INJECTION INTRAMUSCULAR; INTRAVENOUS; SUBCUTANEOUS at 12:57

## 2021-06-25 NOTE — ED PROVIDER NOTE - CARE PLAN
Principal Discharge DX:	Dizziness   Principal Discharge DX:	Dizziness  Secondary Diagnosis:	Cerumen impaction

## 2021-06-25 NOTE — ED PROVIDER NOTE - PATIENT PORTAL LINK FT
You can access the FollowMyHealth Patient Portal offered by [facility] by registering at the following website: [email or web address]. By joining [employer]’s FollowMyHealth portal, you will also be able to view your health information using other applications (apps) compatible with our system.

## 2021-06-25 NOTE — ED PROVIDER NOTE - OBJECTIVE STATEMENT
72yo male HTN p/w constant mild "dizziness" since 10am, started while walking in his kitchen. Does not feel like lightheadedness or room spinning sensation. Never had this before, not affecting his walking. Denies chest pain, fever, headache, vision changes, weakness, numbness, N/V/D, abdominal pain, tinnitus, hearing loss, recent illness.

## 2021-06-25 NOTE — ED PROVIDER NOTE - CLINICAL SUMMARY MEDICAL DECISION MAKING FREE TEXT BOX
72yo male p/w dizziness x 2 hours. No findings on neuro exam, ambulatory. Possibly 2/2 BP meds but no recent changes. Doubt CVA. Lower suspicion for BPPV vs vestibular neuritis. EKG unremarkable. Trial meclizine. If no improvement, will check labs and CT head.

## 2021-06-25 NOTE — ED PROVIDER NOTE - ATTENDING CONTRIBUTION TO CARE
Pt was seen and evaluated by me. Pt is a 70 y/o male with PMHx of HTN who presented to the ED for episode of dizziness today. Pt states he got up from sitting and walked to the kitchen when he felt dizzy. Pt denies any difficulty with ambulation or weakness. Pt described dizziness as lightheadedness. Pt denies any headache. Pt denies any neck pain, back pain, fever, chills, nausea, vomiting, SOB, chest pain, or abd pain. Lungs CTA b/l. RRR. Abd soft, no-tender. No nystagmus. Noted to have b/l cerumen impaction.   Concern for Dehydration/Cerumen Impaction/Electrolyte abnormality  Labs, EKG, Cerumen removal

## 2021-06-25 NOTE — ED PROVIDER NOTE - NEUROLOGICAL, MLM
Alert and oriented, no focal deficits, no motor or sensory deficits. Good finger to nose. No focal deficits. Ambulating without any difficulty.

## 2021-06-25 NOTE — ED ADULT TRIAGE NOTE - CHIEF COMPLAINT QUOTE
C/o dizziness and generalized weakness x 1.5 hours. Denies fevers/chills/headache or nausea. Denies any pain. H/o HTN.

## 2021-06-25 NOTE — ED ADULT NURSE NOTE - OBJECTIVE STATEMENT
pt received to room 13, a&ox 4 and ambulatory. pmh HTN . Received with a C/O dizziness which started this morning. BP meds taken hours prior to symptom. Pt verbalized no history of dizziness. Breathing even and unlabored on room air. NSR on cardiac monitor. Denies fever, chills, cough, SOB, chest pain, palpitations, dizziness, N/V/D. 20 G angiocath placed to right AC. Labs collected and sent. Meds administered as ordered. EKG in chart. Safety maintained. Will continue to monitor

## 2021-06-25 NOTE — ED PROVIDER NOTE - PROGRESS NOTE DETAILS
Dr. Siegel: After cerumen removal pt states feeling better. David PGY2: Patient reevaluated and feeling better. Counseled to avoid q tip use. Reviewed and discussed results with patient. Discussed importance of follow up and return precautions. Patient agrees with plan.

## 2021-06-25 NOTE — ED PROVIDER NOTE - NSFOLLOWUPINSTRUCTIONS_ED_ALL_ED_FT
- Continue all regular medications  - avoid using q tips  - you may use debrox as instructed on the packaging to relieve your ear wax impactions  - Follow up with your primary doctor within 3 days, discuss your creatinine level  - You were given copies of labs and/or imaging results if applicable, please take them to your follow up appointments  - Return to the ER for inability to walk, change in vision, numbness/weakness on one side of body or any worsening symptoms or concerns

## 2021-06-25 NOTE — ED PROVIDER NOTE - PHYSICAL EXAMINATION
Physical Exam:  Gen: NAD, AOx3, non-toxic appearing, able to ambulate without assistance  Head: NCAT  HEENT: EOMI, PEERLA, normal conjunctiva, tongue midline, oral mucosa moist  Lung: CTAB, no respiratory distress, no wheezes/rhonchi/rales B/L, speaking in full sentences  CV: RRR, no murmurs, rubs or gallops, distal pulses 2+ b/l  Abd: soft, NT, ND, no guarding, no rigidity, no rebound tenderness, no CVA tenderness   MSK: no visible deformities, ROM normal in UE/LE, no back TTP  Neuro: CN II-XII intact, normal FTN, no nystagmus, normal gait, 5/5 strength b/l, sensation intact b/l  Skin: Warm, well perfused, no rash, no leg swelling  Psych: normal affect, calm

## 2021-12-06 NOTE — ED PROVIDER NOTE - TEMPLATE
A: Bedside report given to Noy RN, all current drips, treatments, skin issues, and plan of care were reviewed by both RN's, patient's care transferred in stable condition. 4 Eyes Skin Assessment     The patient is being assess for   Transfer to New Unit    I agree that 2 RN's have performed a thorough Head to Toe Skin Assessment on the patient. ALL assessment sites listed below have been assessed. Areas assessed for pressure by both nurses:   [x]   Head, Face, and Ears   [x]   Shoulders, Back, and Chest, Abdomen  [x]   Arms, Elbows, and Hands   [x]   Coccyx, Sacrum, and Ischium  [x]   Legs, Feet, and Heels     Scattered bruising, abrasions and petechiae. Skin Assessed Under all Medical Devices by both nurses:  SCD's              **SHARE this note so that the co-signing nurse is able to place an eSignature**    Co-signer eSignature: Electronically signed by Miracle Perez RN on 12/6/21 at 5:55 PM EST    Does the Patient have Skin Breakdown related to pressure?   No            Lyle Prevention initiated:  Yes   Wound Care Orders initiated:  No      WOC nurse consulted for Pressure Injury (Stage 3,4, Unstageable, DTI, NWPT, Complex wounds)and New or Established Ostomies:  NA      Primary Nurse eSignature: Electronically signed by Daktoa Gann RN on 12/6/21 at 6:38 PM EST Cardiac

## 2024-03-08 NOTE — ED PROVIDER NOTE - MUSCULOSKELETAL, MLM
all other ROS negative except as per HPI
Spine appears normal, range of motion is not limited, no muscle or joint tenderness

## 2024-05-13 NOTE — ED PROVIDER NOTE - CPE EDP ENMT NORM
Medication: metFORMIN (GLUCOPHAGE) 500 mg tablet     Dose/Frequency: TAKE ONE TABLET BY MOUTH TWICE A DAY WITH MEALS     Quantity: 180    Pharmacy: [facility] - ELENITA Novak - [address] [place] [address].      Office:   [x] PCP/Provider -   [] Speciality/Provider -     Does the patient have enough for 3 days?   [] Yes   [x] No - Send as HP to POD      Pt stated the pharm has been trying to send for refills but it gets kicked back.     Please advise.      normal...

## 2024-07-01 NOTE — ED ADULT TRIAGE NOTE - BP NONINVASIVE DIASTOLIC (MM HG)
For Your Information     If your provider ordered any imaging for you today. Our pre-scheduling services will be reaching out to you within 2 business days to schedule this. Prescheduling Services can be reached by calling [phone].    If you are in need of a medication refill, please use one of the following options. You can expect your medication to be filled within 2-3 business days.   1. Call your pharmacy for all medication refills and renewals.   2. [email or web address]- [email or web address]  3. Call your providers office    If your provider ordered testing today, you will be notified of your lab results within 3-5 business days and imaging results within 7-14 business days, unless specified otherwise. If you have not received your results within the allotted business days please call your provider's office. Have you signed up for the [employer], if not please consider doing so to receive results on the good as soon as they have been resulted by the system. [email or web address]     Medication Prior Authorizations may take up to 30 days for approval/denial.     You may be receiving a survey.  Please take the time to complete this, as your feedback is very important to us!  We strive to make your experience exceptional, and your comments help us with that goal.  We look forward to hearing from you!    For all future appointments please arrive 15 minutes prior to your scheduled visit.     Patient Contact Center Business Office: assistance with medical billing & financial inquires [phone]              
[phone]